# Patient Record
Sex: MALE | Race: WHITE | Employment: UNEMPLOYED | URBAN - METROPOLITAN AREA
[De-identification: names, ages, dates, MRNs, and addresses within clinical notes are randomized per-mention and may not be internally consistent; named-entity substitution may affect disease eponyms.]

---

## 2020-01-01 ENCOUNTER — APPOINTMENT (OUTPATIENT)
Dept: CT IMAGING | Age: 80
DRG: 871 | End: 2020-01-01
Payer: COMMERCIAL

## 2020-01-01 ENCOUNTER — APPOINTMENT (OUTPATIENT)
Dept: GENERAL RADIOLOGY | Age: 80
DRG: 871 | End: 2020-01-01
Payer: COMMERCIAL

## 2020-01-01 ENCOUNTER — HOSPITAL ENCOUNTER (INPATIENT)
Age: 80
LOS: 2 days | DRG: 871 | End: 2020-11-18
Attending: EMERGENCY MEDICINE | Admitting: INTERNAL MEDICINE
Payer: COMMERCIAL

## 2020-01-01 VITALS
HEIGHT: 74 IN | SYSTOLIC BLOOD PRESSURE: 60 MMHG | WEIGHT: 242.95 LBS | HEART RATE: 131 BPM | RESPIRATION RATE: 39 BRPM | BODY MASS INDEX: 31.18 KG/M2 | TEMPERATURE: 98.4 F | OXYGEN SATURATION: 74 % | DIASTOLIC BLOOD PRESSURE: 33 MMHG

## 2020-01-01 LAB
-: NORMAL
ABSOLUTE EOS #: 0 K/UL (ref 0–0.4)
ABSOLUTE EOS #: 0 K/UL (ref 0–0.4)
ABSOLUTE EOS #: 0 K/UL (ref 0–0.44)
ABSOLUTE EOS #: 0.24 K/UL (ref 0–0.44)
ABSOLUTE IMMATURE GRANULOCYTE: 0.08 K/UL (ref 0–0.3)
ABSOLUTE IMMATURE GRANULOCYTE: 0.37 K/UL (ref 0–0.3)
ABSOLUTE IMMATURE GRANULOCYTE: 0.78 K/UL (ref 0–0.3)
ABSOLUTE IMMATURE GRANULOCYTE: 0.88 K/UL (ref 0–0.3)
ABSOLUTE LYMPH #: 0.75 K/UL (ref 1.1–3.7)
ABSOLUTE LYMPH #: 1.16 K/UL (ref 1.1–3.7)
ABSOLUTE LYMPH #: 1.77 K/UL (ref 1–4.8)
ABSOLUTE LYMPH #: 2.24 K/UL (ref 1–4.8)
ABSOLUTE MONO #: 0.37 K/UL (ref 0.1–0.8)
ABSOLUTE MONO #: 0.78 K/UL (ref 0.1–1.2)
ABSOLUTE MONO #: 0.87 K/UL (ref 0.1–1.2)
ABSOLUTE MONO #: 2.21 K/UL (ref 0.1–0.8)
ACTION: NORMAL
ALLEN TEST: ABNORMAL
AMORPHOUS: NORMAL
ANION GAP SERPL CALCULATED.3IONS-SCNC: 11 MMOL/L (ref 9–17)
ANION GAP SERPL CALCULATED.3IONS-SCNC: 11 MMOL/L (ref 9–17)
ANION GAP SERPL CALCULATED.3IONS-SCNC: 13 MMOL/L (ref 9–17)
ANION GAP SERPL CALCULATED.3IONS-SCNC: 14 MMOL/L (ref 9–17)
ANION GAP SERPL CALCULATED.3IONS-SCNC: 15 MMOL/L (ref 9–17)
ANION GAP SERPL CALCULATED.3IONS-SCNC: 9 MMOL/L (ref 9–17)
ANION GAP: 16 MMOL/L (ref 7–16)
ANION GAP: 17 MMOL/L (ref 7–16)
ANION GAP: NORMAL MMOL/L (ref 7–16)
BACTERIA: NORMAL
BASOPHILS # BLD: 0 % (ref 0–2)
BASOPHILS ABSOLUTE: 0 K/UL (ref 0–0.2)
BASOPHILS ABSOLUTE: 0.07 K/UL (ref 0–0.2)
BETA-HYDROXYBUTYRATE: 0.11 MMOL/L (ref 0.02–0.27)
BILIRUBIN URINE: NEGATIVE
BNP INTERPRETATION: ABNORMAL
BNP INTERPRETATION: NORMAL
BUN BLDV-MCNC: 22 MG/DL (ref 8–23)
BUN BLDV-MCNC: 33 MG/DL (ref 8–23)
BUN BLDV-MCNC: 33 MG/DL (ref 8–23)
BUN BLDV-MCNC: 34 MG/DL (ref 8–23)
BUN BLDV-MCNC: 34 MG/DL (ref 8–23)
BUN BLDV-MCNC: 35 MG/DL (ref 8–23)
BUN BLDV-MCNC: 36 MG/DL (ref 8–23)
BUN BLDV-MCNC: 39 MG/DL (ref 8–23)
BUN/CREAT BLD: ABNORMAL (ref 9–20)
CALCIUM IONIZED: 1.03 MMOL/L (ref 1.13–1.33)
CALCIUM SERPL-MCNC: 7 MG/DL (ref 8.6–10.4)
CALCIUM SERPL-MCNC: 7.2 MG/DL (ref 8.6–10.4)
CALCIUM SERPL-MCNC: 7.2 MG/DL (ref 8.6–10.4)
CALCIUM SERPL-MCNC: 7.3 MG/DL (ref 8.6–10.4)
CALCIUM SERPL-MCNC: 7.4 MG/DL (ref 8.6–10.4)
CALCIUM SERPL-MCNC: 7.6 MG/DL (ref 8.6–10.4)
CALCIUM SERPL-MCNC: 7.7 MG/DL (ref 8.6–10.4)
CALCIUM SERPL-MCNC: 9.1 MG/DL (ref 8.6–10.4)
CASTS UA: NORMAL /LPF (ref 0–8)
CHLORIDE BLD-SCNC: 100 MMOL/L (ref 98–107)
CHLORIDE BLD-SCNC: 100 MMOL/L (ref 98–107)
CHLORIDE BLD-SCNC: 101 MMOL/L (ref 98–107)
CHLORIDE BLD-SCNC: 101 MMOL/L (ref 98–107)
CHLORIDE BLD-SCNC: 102 MMOL/L (ref 98–107)
CHLORIDE BLD-SCNC: 103 MMOL/L (ref 98–107)
CHLORIDE BLD-SCNC: 103 MMOL/L (ref 98–107)
CHLORIDE BLD-SCNC: 105 MMOL/L (ref 98–107)
CHOLESTEROL/HDL RATIO: 2.1
CHOLESTEROL: 64 MG/DL
CO2: 15 MMOL/L (ref 20–31)
CO2: 15 MMOL/L (ref 20–31)
CO2: 17 MMOL/L (ref 20–31)
CO2: 17 MMOL/L (ref 20–31)
CO2: 18 MMOL/L (ref 20–31)
CO2: 18 MMOL/L (ref 20–31)
CO2: 19 MMOL/L (ref 20–31)
CO2: 20 MMOL/L (ref 20–31)
COLOR: YELLOW
COMMENT UA: ABNORMAL
CREAT SERPL-MCNC: 1.08 MG/DL (ref 0.7–1.2)
CREAT SERPL-MCNC: 1.97 MG/DL (ref 0.7–1.2)
CREAT SERPL-MCNC: 2.29 MG/DL (ref 0.7–1.2)
CREAT SERPL-MCNC: 2.35 MG/DL (ref 0.7–1.2)
CREAT SERPL-MCNC: 2.64 MG/DL (ref 0.7–1.2)
CREAT SERPL-MCNC: 2.68 MG/DL (ref 0.7–1.2)
CREAT SERPL-MCNC: 3.1 MG/DL (ref 0.7–1.2)
CREAT SERPL-MCNC: 3.99 MG/DL (ref 0.7–1.2)
CRYSTALS, UA: NORMAL /HPF
CULTURE: NORMAL
CULTURE: NORMAL
D-DIMER QUANTITATIVE: 1.03 MG/L FEU
DATE AND TIME: NORMAL
DIFFERENTIAL TYPE: ABNORMAL
DIRECT EXAM: NORMAL
DIRECT EXAM: NORMAL
EKG ATRIAL RATE: 106 BPM
EKG ATRIAL RATE: 110 BPM
EKG ATRIAL RATE: 117 BPM
EKG ATRIAL RATE: 121 BPM
EKG ATRIAL RATE: 187 BPM
EKG ATRIAL RATE: 241 BPM
EKG ATRIAL RATE: 267 BPM
EKG ATRIAL RATE: 86 BPM
EKG ATRIAL RATE: 97 BPM
EKG P AXIS: 106 DEGREES
EKG P AXIS: 62 DEGREES
EKG P AXIS: 96 DEGREES
EKG P-R INTERVAL: 144 MS
EKG P-R INTERVAL: 188 MS
EKG P-R INTERVAL: 194 MS
EKG Q-T INTERVAL: 298 MS
EKG Q-T INTERVAL: 306 MS
EKG Q-T INTERVAL: 324 MS
EKG Q-T INTERVAL: 328 MS
EKG Q-T INTERVAL: 336 MS
EKG Q-T INTERVAL: 338 MS
EKG Q-T INTERVAL: 342 MS
EKG Q-T INTERVAL: 376 MS
EKG Q-T INTERVAL: 386 MS
EKG QRS DURATION: 100 MS
EKG QRS DURATION: 102 MS
EKG QRS DURATION: 104 MS
EKG QRS DURATION: 132 MS
EKG QRS DURATION: 138 MS
EKG QRS DURATION: 142 MS
EKG QRS DURATION: 94 MS
EKG QRS DURATION: 94 MS
EKG QRS DURATION: 96 MS
EKG QTC CALCULATION (BAZETT): 422 MS
EKG QTC CALCULATION (BAZETT): 423 MS
EKG QTC CALCULATION (BAZETT): 426 MS
EKG QTC CALCULATION (BAZETT): 432 MS
EKG QTC CALCULATION (BAZETT): 435 MS
EKG QTC CALCULATION (BAZETT): 436 MS
EKG QTC CALCULATION (BAZETT): 450 MS
EKG QTC CALCULATION (BAZETT): 492 MS
EKG QTC CALCULATION (BAZETT): 492 MS
EKG R AXIS: -39 DEGREES
EKG R AXIS: -60 DEGREES
EKG R AXIS: -64 DEGREES
EKG R AXIS: -65 DEGREES
EKG R AXIS: -71 DEGREES
EKG R AXIS: -73 DEGREES
EKG R AXIS: -78 DEGREES
EKG R AXIS: -78 DEGREES
EKG R AXIS: -90 DEGREES
EKG T AXIS: 57 DEGREES
EKG T AXIS: 63 DEGREES
EKG T AXIS: 69 DEGREES
EKG T AXIS: 70 DEGREES
EKG T AXIS: 70 DEGREES
EKG T AXIS: 77 DEGREES
EKG T AXIS: 79 DEGREES
EKG T AXIS: 80 DEGREES
EKG T AXIS: 90 DEGREES
EKG VENTRICULAR RATE: 103 BPM
EKG VENTRICULAR RATE: 106 BPM
EKG VENTRICULAR RATE: 107 BPM
EKG VENTRICULAR RATE: 108 BPM
EKG VENTRICULAR RATE: 117 BPM
EKG VENTRICULAR RATE: 121 BPM
EKG VENTRICULAR RATE: 94 BPM
EKG VENTRICULAR RATE: 98 BPM
EKG VENTRICULAR RATE: 98 BPM
EOSINOPHILS RELATIVE PERCENT: 0 % (ref 1–4)
EOSINOPHILS RELATIVE PERCENT: 1 % (ref 1–4)
EPITHELIAL CELLS UA: NORMAL /HPF (ref 0–5)
ESTIMATED AVERAGE GLUCOSE: 174 MG/DL
FIO2: 100
FIO2: ABNORMAL
GFR AFRICAN AMERICAN: 18 ML/MIN
GFR AFRICAN AMERICAN: 24 ML/MIN
GFR AFRICAN AMERICAN: 28 ML/MIN
GFR AFRICAN AMERICAN: 29 ML/MIN
GFR AFRICAN AMERICAN: 33 ML/MIN
GFR AFRICAN AMERICAN: 34 ML/MIN
GFR AFRICAN AMERICAN: 40 ML/MIN
GFR AFRICAN AMERICAN: >60 ML/MIN
GFR NON-AFRICAN AMERICAN: 15 ML/MIN
GFR NON-AFRICAN AMERICAN: 18 ML/MIN
GFR NON-AFRICAN AMERICAN: 20 ML/MIN
GFR NON-AFRICAN AMERICAN: 20 ML/MIN
GFR NON-AFRICAN AMERICAN: 23 ML/MIN
GFR NON-AFRICAN AMERICAN: 24 ML/MIN
GFR NON-AFRICAN AMERICAN: 27 ML/MIN
GFR NON-AFRICAN AMERICAN: 28 ML/MIN
GFR NON-AFRICAN AMERICAN: 33 ML/MIN
GFR NON-AFRICAN AMERICAN: >60 ML/MIN
GFR NON-AFRICAN AMERICAN: NORMAL ML/MIN
GFR SERPL CREATININE-BSD FRML MDRD: 21 ML/MIN
GFR SERPL CREATININE-BSD FRML MDRD: 24 ML/MIN
GFR SERPL CREATININE-BSD FRML MDRD: ABNORMAL ML/MIN/{1.73_M2}
GFR SERPL CREATININE-BSD FRML MDRD: NORMAL ML/MIN
GFR SERPL CREATININE-BSD FRML MDRD: NORMAL ML/MIN/{1.73_M2}
GLUCOSE BLD-MCNC: 127 MG/DL (ref 75–110)
GLUCOSE BLD-MCNC: 137 MG/DL (ref 74–100)
GLUCOSE BLD-MCNC: 138 MG/DL (ref 75–110)
GLUCOSE BLD-MCNC: 160 MG/DL (ref 74–100)
GLUCOSE BLD-MCNC: 179 MG/DL (ref 75–110)
GLUCOSE BLD-MCNC: 180 MG/DL (ref 70–99)
GLUCOSE BLD-MCNC: 194 MG/DL (ref 75–110)
GLUCOSE BLD-MCNC: 198 MG/DL (ref 74–100)
GLUCOSE BLD-MCNC: 205 MG/DL (ref 75–110)
GLUCOSE BLD-MCNC: 216 MG/DL (ref 75–110)
GLUCOSE BLD-MCNC: 219 MG/DL (ref 75–110)
GLUCOSE BLD-MCNC: 220 MG/DL (ref 70–99)
GLUCOSE BLD-MCNC: 230 MG/DL (ref 75–110)
GLUCOSE BLD-MCNC: 237 MG/DL (ref 70–99)
GLUCOSE BLD-MCNC: 240 MG/DL (ref 74–100)
GLUCOSE BLD-MCNC: 262 MG/DL (ref 75–110)
GLUCOSE BLD-MCNC: 320 MG/DL (ref 75–110)
GLUCOSE BLD-MCNC: 329 MG/DL (ref 75–110)
GLUCOSE BLD-MCNC: 336 MG/DL (ref 74–100)
GLUCOSE BLD-MCNC: 339 MG/DL (ref 75–110)
GLUCOSE BLD-MCNC: 343 MG/DL (ref 75–110)
GLUCOSE BLD-MCNC: 345 MG/DL (ref 75–110)
GLUCOSE BLD-MCNC: 367 MG/DL (ref 75–110)
GLUCOSE BLD-MCNC: 372 MG/DL (ref 75–110)
GLUCOSE BLD-MCNC: 403 MG/DL (ref 75–110)
GLUCOSE BLD-MCNC: 415 MG/DL (ref 75–110)
GLUCOSE BLD-MCNC: 421 MG/DL (ref 75–110)
GLUCOSE BLD-MCNC: 427 MG/DL (ref 70–99)
GLUCOSE BLD-MCNC: 442 MG/DL (ref 75–110)
GLUCOSE BLD-MCNC: 447 MG/DL (ref 70–99)
GLUCOSE BLD-MCNC: 451 MG/DL (ref 70–99)
GLUCOSE BLD-MCNC: 465 MG/DL (ref 75–110)
GLUCOSE BLD-MCNC: 470 MG/DL (ref 74–100)
GLUCOSE BLD-MCNC: 479 MG/DL (ref 70–99)
GLUCOSE BLD-MCNC: 480 MG/DL (ref 70–99)
GLUCOSE BLD-MCNC: 495 MG/DL (ref 74–100)
GLUCOSE BLD-MCNC: 515 MG/DL (ref 74–100)
GLUCOSE BLD-MCNC: 527 MG/DL (ref 74–100)
GLUCOSE URINE: NEGATIVE
HBA1C MFR BLD: 7.7 % (ref 4–6)
HCO3 VENOUS: 26.2 MMOL/L (ref 22–29)
HCT VFR BLD CALC: 41.3 % (ref 40.7–50.3)
HCT VFR BLD CALC: 43.6 % (ref 40.7–50.3)
HCT VFR BLD CALC: 44.8 % (ref 40.7–50.3)
HCT VFR BLD CALC: 46.7 % (ref 40.7–50.3)
HCT VFR BLD CALC: 48 % (ref 40.7–50.3)
HDLC SERPL-MCNC: 30 MG/DL
HEMOGLOBIN: 12.4 G/DL (ref 13–17)
HEMOGLOBIN: 13.3 G/DL (ref 13–17)
HEMOGLOBIN: 13.7 G/DL (ref 13–17)
HEMOGLOBIN: 14 G/DL (ref 13–17)
HEMOGLOBIN: 14.2 G/DL (ref 13–17)
IMMATURE GRANULOCYTES: 0 %
IMMATURE GRANULOCYTES: 1 %
IMMATURE GRANULOCYTES: 2 %
IMMATURE GRANULOCYTES: 2 %
INR BLD: 1
KETONES, URINE: NEGATIVE
LACTIC ACID, SEPSIS WHOLE BLOOD: 1.9 MMOL/L (ref 0.5–1.9)
LACTIC ACID, SEPSIS WHOLE BLOOD: 2 MMOL/L (ref 0.5–1.9)
LACTIC ACID, SEPSIS WHOLE BLOOD: 3.1 MMOL/L (ref 0.5–1.9)
LACTIC ACID, SEPSIS: ABNORMAL MMOL/L (ref 0.5–1.9)
LACTIC ACID, SEPSIS: ABNORMAL MMOL/L (ref 0.5–1.9)
LACTIC ACID, SEPSIS: NORMAL MMOL/L (ref 0.5–1.9)
LACTIC ACID, WHOLE BLOOD: 2.9 MMOL/L (ref 0.7–2.1)
LDL CHOLESTEROL: 0 MG/DL (ref 0–130)
LEUKOCYTE ESTERASE, URINE: NEGATIVE
LYMPHOCYTES # BLD: 3 % (ref 24–43)
LYMPHOCYTES # BLD: 4 % (ref 24–43)
LYMPHOCYTES # BLD: 4 % (ref 24–44)
LYMPHOCYTES # BLD: 6 % (ref 24–44)
Lab: NORMAL
MAGNESIUM: 1 MG/DL (ref 1.6–2.6)
MAGNESIUM: 1.1 MG/DL (ref 1.6–2.6)
MAGNESIUM: 1.3 MG/DL (ref 1.6–2.6)
MAGNESIUM: 1.4 MG/DL (ref 1.6–2.6)
MAGNESIUM: 1.4 MG/DL (ref 1.6–2.6)
MAGNESIUM: 1.5 MG/DL (ref 1.6–2.6)
MAGNESIUM: 1.6 MG/DL (ref 1.6–2.6)
MAGNESIUM: 1.8 MG/DL (ref 1.6–2.6)
MAGNESIUM: 1.8 MG/DL (ref 1.6–2.6)
MAGNESIUM: 1.9 MG/DL (ref 1.6–2.6)
MAGNESIUM: 2 MG/DL (ref 1.6–2.6)
MAGNESIUM: 2.2 MG/DL (ref 1.6–2.6)
MCH RBC QN AUTO: 29 PG (ref 25.2–33.5)
MCH RBC QN AUTO: 29.3 PG (ref 25.2–33.5)
MCH RBC QN AUTO: 29.3 PG (ref 25.2–33.5)
MCH RBC QN AUTO: 29.4 PG (ref 25.2–33.5)
MCHC RBC AUTO-ENTMCNC: 29.3 G/DL (ref 28.4–34.8)
MCHC RBC AUTO-ENTMCNC: 29.6 G/DL (ref 28.4–34.8)
MCHC RBC AUTO-ENTMCNC: 30 G/DL (ref 28.4–34.8)
MCHC RBC AUTO-ENTMCNC: 31.3 G/DL (ref 28.4–34.8)
MCV RBC AUTO: 94.1 FL (ref 82.6–102.9)
MCV RBC AUTO: 97.6 FL (ref 82.6–102.9)
MCV RBC AUTO: 98.7 FL (ref 82.6–102.9)
MCV RBC AUTO: 99.2 FL (ref 82.6–102.9)
MODE: ABNORMAL
MONOCYTES # BLD: 1 % (ref 1–7)
MONOCYTES # BLD: 2 % (ref 3–12)
MONOCYTES # BLD: 4 % (ref 3–12)
MONOCYTES # BLD: 5 % (ref 1–7)
MORPHOLOGY: ABNORMAL
MRSA, DNA, NASAL: NORMAL
MUCUS: NORMAL
NEGATIVE BASE EXCESS, ART: 12 (ref 0–2)
NEGATIVE BASE EXCESS, ART: 13 (ref 0–2)
NEGATIVE BASE EXCESS, ART: 13 (ref 0–2)
NEGATIVE BASE EXCESS, ART: 14 (ref 0–2)
NEGATIVE BASE EXCESS, ART: 15 (ref 0–2)
NEGATIVE BASE EXCESS, ART: 15 (ref 0–2)
NEGATIVE BASE EXCESS, ART: 16 (ref 0–2)
NEGATIVE BASE EXCESS, ART: 18 (ref 0–2)
NEGATIVE BASE EXCESS, ART: 6 (ref 0–2)
NEGATIVE BASE EXCESS, ART: 8 (ref 0–2)
NEGATIVE BASE EXCESS, VEN: 3 (ref 0–2)
NITRITE, URINE: NEGATIVE
NOTIFY: NORMAL
NRBC AUTOMATED: 0 PER 100 WBC
NRBC AUTOMATED: 0.1 PER 100 WBC
O2 DEVICE/FLOW/%: ABNORMAL
O2 SAT, VEN: 23 % (ref 60–85)
OTHER OBSERVATIONS UA: NORMAL
PARTIAL THROMBOPLASTIN TIME: 19.8 SEC (ref 20.5–30.5)
PATIENT TEMP: ABNORMAL
PCO2, VEN: 60.6 MM HG (ref 41–51)
PDW BLD-RTO: 12.3 % (ref 11.8–14.4)
PDW BLD-RTO: 12.4 % (ref 11.8–14.4)
PDW BLD-RTO: 12.6 % (ref 11.8–14.4)
PDW BLD-RTO: 12.8 % (ref 11.8–14.4)
PH UA: 5 (ref 5–8)
PH VENOUS: 7.24 (ref 7.32–7.43)
PHOSPHORUS: 3.9 MG/DL (ref 2.5–4.5)
PHOSPHORUS: 4.4 MG/DL (ref 2.5–4.5)
PHOSPHORUS: 4.7 MG/DL (ref 2.5–4.5)
PHOSPHORUS: 4.9 MG/DL (ref 2.5–4.5)
PHOSPHORUS: 5.1 MG/DL (ref 2.5–4.5)
PHOSPHORUS: 5.1 MG/DL (ref 2.5–4.5)
PHOSPHORUS: 5.2 MG/DL (ref 2.5–4.5)
PHOSPHORUS: 5.3 MG/DL (ref 2.5–4.5)
PHOSPHORUS: 5.3 MG/DL (ref 2.5–4.5)
PHOSPHORUS: 5.6 MG/DL (ref 2.5–4.5)
PHOSPHORUS: 6 MG/DL (ref 2.5–4.5)
PHOSPHORUS: 6.3 MG/DL (ref 2.5–4.5)
PLATELET # BLD: 224 K/UL (ref 138–453)
PLATELET # BLD: 231 K/UL (ref 138–453)
PLATELET # BLD: 305 K/UL (ref 138–453)
PLATELET # BLD: 313 K/UL (ref 138–453)
PLATELET ESTIMATE: ABNORMAL
PMV BLD AUTO: 10.6 FL (ref 8.1–13.5)
PMV BLD AUTO: 10.9 FL (ref 8.1–13.5)
PMV BLD AUTO: 11.4 FL (ref 8.1–13.5)
PMV BLD AUTO: 11.5 FL (ref 8.1–13.5)
PO2, VEN: 19.9 MM HG (ref 30–50)
POC CHLORIDE: 103 MMOL/L (ref 98–107)
POC CHLORIDE: 103 MMOL/L (ref 98–107)
POC CHLORIDE: 98 MMOL/L (ref 98–107)
POC CREATININE: 3.04 MG/DL (ref 0.51–1.19)
POC CREATININE: 3.41 MG/DL (ref 0.51–1.19)
POC CREATININE: NORMAL MG/DL (ref 0.51–1.19)
POC HCO3: 18.4 MMOL/L (ref 21–28)
POC HCO3: 18.5 MMOL/L (ref 21–28)
POC HCO3: 18.5 MMOL/L (ref 21–28)
POC HCO3: 19 MMOL/L (ref 21–28)
POC HCO3: 19.4 MMOL/L (ref 21–28)
POC HCO3: 19.9 MMOL/L (ref 21–28)
POC HCO3: 20 MMOL/L (ref 21–28)
POC HCO3: 20.4 MMOL/L (ref 21–28)
POC HCO3: 20.4 MMOL/L (ref 21–28)
POC HCO3: 20.7 MMOL/L (ref 21–28)
POC HCO3: 20.8 MMOL/L (ref 21–28)
POC HCO3: 22 MMOL/L (ref 21–28)
POC HCO3: 22.2 MMOL/L (ref 21–28)
POC HEMATOCRIT: 34 % (ref 41–53)
POC HEMATOCRIT: 34 % (ref 41–53)
POC HEMATOCRIT: NORMAL % (ref 41–53)
POC HEMOGLOBIN: 11.4 G/DL (ref 13.5–17.5)
POC HEMOGLOBIN: 11.7 G/DL (ref 13.5–17.5)
POC HEMOGLOBIN: NORMAL G/DL (ref 13.5–17.5)
POC IONIZED CALCIUM: 1.1 MMOL/L (ref 1.15–1.33)
POC IONIZED CALCIUM: 1.28 MMOL/L (ref 1.15–1.33)
POC IONIZED CALCIUM: 1.29 MMOL/L (ref 1.15–1.33)
POC LACTIC ACID: 1.84 MMOL/L (ref 0.56–1.39)
POC LACTIC ACID: 12.06 MMOL/L (ref 0.56–1.39)
POC LACTIC ACID: 3.21 MMOL/L (ref 0.56–1.39)
POC LACTIC ACID: 4.01 MMOL/L (ref 0.56–1.39)
POC LACTIC ACID: 4.82 MMOL/L (ref 0.56–1.39)
POC LACTIC ACID: 5 MMOL/L (ref 0.56–1.39)
POC LACTIC ACID: 5.25 MMOL/L (ref 0.56–1.39)
POC LACTIC ACID: 8.05 MMOL/L (ref 0.56–1.39)
POC LACTIC ACID: 9.19 MMOL/L (ref 0.56–1.39)
POC O2 SATURATION: 44 % (ref 94–98)
POC O2 SATURATION: 49 % (ref 94–98)
POC O2 SATURATION: 49 % (ref 94–98)
POC O2 SATURATION: 54 % (ref 94–98)
POC O2 SATURATION: 64 % (ref 94–98)
POC O2 SATURATION: 64 % (ref 94–98)
POC O2 SATURATION: 65 % (ref 94–98)
POC O2 SATURATION: 66 % (ref 94–98)
POC O2 SATURATION: 71 % (ref 94–98)
POC O2 SATURATION: 73 % (ref 94–98)
POC O2 SATURATION: 81 % (ref 94–98)
POC O2 SATURATION: 95 % (ref 94–98)
POC O2 SATURATION: 98 % (ref 94–98)
POC PCO2 TEMP: ABNORMAL MM HG
POC PCO2: 100.8 MM HG (ref 35–48)
POC PCO2: 101.2 MM HG (ref 35–48)
POC PCO2: 102.8 MM HG (ref 35–48)
POC PCO2: 51.5 MM HG (ref 35–48)
POC PCO2: 55.1 MM HG (ref 35–48)
POC PCO2: 83.4 MM HG (ref 35–48)
POC PCO2: 84.7 MM HG (ref 35–48)
POC PCO2: 85.5 MM HG (ref 35–48)
POC PCO2: 85.5 MM HG (ref 35–48)
POC PCO2: 86.3 MM HG (ref 35–48)
POC PCO2: 87.1 MM HG (ref 35–48)
POC PCO2: 90.4 MM HG (ref 35–48)
POC PCO2: 92.9 MM HG (ref 35–48)
POC PH TEMP: ABNORMAL
POC PH: 6.86 (ref 7.35–7.45)
POC PH: 6.91 (ref 7.35–7.45)
POC PH: 6.92 (ref 7.35–7.45)
POC PH: 6.94 (ref 7.35–7.45)
POC PH: 6.94 (ref 7.35–7.45)
POC PH: 6.95 (ref 7.35–7.45)
POC PH: 6.95 (ref 7.35–7.45)
POC PH: 6.96 (ref 7.35–7.45)
POC PH: 6.98 (ref 7.35–7.45)
POC PH: 6.98 (ref 7.35–7.45)
POC PH: 7 (ref 7.35–7.45)
POC PH: 7.21 (ref 7.35–7.45)
POC PH: 7.21 (ref 7.35–7.45)
POC PO2 TEMP: ABNORMAL MM HG
POC PO2: 123.2 MM HG (ref 83–108)
POC PO2: 41.2 MM HG (ref 83–108)
POC PO2: 42.8 MM HG (ref 83–108)
POC PO2: 43.9 MM HG (ref 83–108)
POC PO2: 46.5 MM HG (ref 83–108)
POC PO2: 52.3 MM HG (ref 83–108)
POC PO2: 52.4 MM HG (ref 83–108)
POC PO2: 53.1 MM HG (ref 83–108)
POC PO2: 53.6 MM HG (ref 83–108)
POC PO2: 59.1 MM HG (ref 83–108)
POC PO2: 69 MM HG (ref 83–108)
POC PO2: 74.2 MM HG (ref 83–108)
POC PO2: 90.5 MM HG (ref 83–108)
POC POTASSIUM: 4.7 MMOL/L (ref 3.5–4.5)
POC POTASSIUM: 4.7 MMOL/L (ref 3.5–4.5)
POC POTASSIUM: 5.2 MMOL/L (ref 3.5–4.5)
POC SODIUM: 135 MMOL/L (ref 138–146)
POC SODIUM: 137 MMOL/L (ref 138–146)
POC SODIUM: NORMAL MMOL/L (ref 138–146)
POSITIVE BASE EXCESS, ART: ABNORMAL (ref 0–3)
POSITIVE BASE EXCESS, VEN: ABNORMAL (ref 0–3)
POTASSIUM SERPL-SCNC: 4.6 MMOL/L (ref 3.7–5.3)
POTASSIUM SERPL-SCNC: 4.7 MMOL/L (ref 3.7–5.3)
POTASSIUM SERPL-SCNC: 4.9 MMOL/L (ref 3.7–5.3)
POTASSIUM SERPL-SCNC: 5.4 MMOL/L (ref 3.7–5.3)
POTASSIUM SERPL-SCNC: 5.5 MMOL/L (ref 3.7–5.3)
POTASSIUM SERPL-SCNC: 5.9 MMOL/L (ref 3.7–5.3)
POTASSIUM SERPL-SCNC: 6 MMOL/L (ref 3.7–5.3)
POTASSIUM SERPL-SCNC: 6.2 MMOL/L (ref 3.7–5.3)
POTASSIUM SERPL-SCNC: 6.7 MMOL/L (ref 3.7–5.3)
PRO-BNP: 139 PG/ML
PRO-BNP: 7908 PG/ML
PROTEIN UA: ABNORMAL
PROTHROMBIN TIME: 10.1 SEC (ref 9–12)
RBC # BLD: 4.23 M/UL (ref 4.21–5.77)
RBC # BLD: 4.73 M/UL (ref 4.21–5.77)
RBC # BLD: 4.76 M/UL (ref 4.21–5.77)
RBC # BLD: 4.84 M/UL (ref 4.21–5.77)
RBC # BLD: ABNORMAL 10*6/UL
RBC UA: NORMAL /HPF (ref 0–4)
READ BACK: YES
RENAL EPITHELIAL, UA: NORMAL /HPF
SAMPLE SITE: ABNORMAL
SARS-COV-2, RAPID: NOT DETECTED
SARS-COV-2: NORMAL
SARS-COV-2: NORMAL
SEG NEUTROPHILS: 89 % (ref 36–66)
SEG NEUTROPHILS: 91 % (ref 36–65)
SEG NEUTROPHILS: 92 % (ref 36–66)
SEG NEUTROPHILS: 93 % (ref 36–65)
SEGMENTED NEUTROPHILS ABSOLUTE COUNT: 19.44 K/UL (ref 1.5–8.1)
SEGMENTED NEUTROPHILS ABSOLUTE COUNT: 34.42 K/UL (ref 1.8–7.7)
SEGMENTED NEUTROPHILS ABSOLUTE COUNT: 36.08 K/UL (ref 1.5–8.1)
SEGMENTED NEUTROPHILS ABSOLUTE COUNT: 39.34 K/UL (ref 1.8–7.7)
SODIUM BLD-SCNC: 126 MMOL/L (ref 135–144)
SODIUM BLD-SCNC: 128 MMOL/L (ref 135–144)
SODIUM BLD-SCNC: 133 MMOL/L (ref 135–144)
SODIUM BLD-SCNC: 134 MMOL/L (ref 135–144)
SODIUM BLD-SCNC: 134 MMOL/L (ref 135–144)
SODIUM BLD-SCNC: 136 MMOL/L (ref 135–144)
SOURCE: NORMAL
SPECIFIC GRAVITY UA: 1.03 (ref 1–1.03)
SPECIMEN DESCRIPTION: NORMAL
TCO2 (CALC), ART: 21 MMOL/L (ref 22–29)
TCO2 (CALC), ART: 21 MMOL/L (ref 22–29)
TCO2 (CALC), ART: 22 MMOL/L (ref 22–29)
TCO2 (CALC), ART: 23 MMOL/L (ref 22–29)
TCO2 (CALC), ART: 24 MMOL/L (ref 22–29)
TCO2 (CALC), ART: 24 MMOL/L (ref 22–29)
TCO2 (CALC), ART: 25 MMOL/L (ref 22–29)
TOTAL CO2, VENOUS: 28 MMOL/L (ref 23–30)
TRICHOMONAS: NORMAL
TRIGL SERPL-MCNC: 169 MG/DL
TROPONIN INTERP: ABNORMAL
TROPONIN T: ABNORMAL NG/ML
TROPONIN, HIGH SENSITIVITY: 117 NG/L (ref 0–22)
TROPONIN, HIGH SENSITIVITY: 203 NG/L (ref 0–22)
TROPONIN, HIGH SENSITIVITY: 221 NG/L (ref 0–22)
TROPONIN, HIGH SENSITIVITY: 292 NG/L (ref 0–22)
TROPONIN, HIGH SENSITIVITY: 36 NG/L (ref 0–22)
TROPONIN, HIGH SENSITIVITY: 38 NG/L (ref 0–22)
TROPONIN, HIGH SENSITIVITY: 70 NG/L (ref 0–22)
TROPONIN, HIGH SENSITIVITY: 96 NG/L (ref 0–22)
TSH SERPL DL<=0.05 MIU/L-ACNC: 0.85 MIU/L (ref 0.3–5)
TURBIDITY: CLEAR
URINE HGB: NEGATIVE
UROBILINOGEN, URINE: NORMAL
VLDLC SERPL CALC-MCNC: ABNORMAL MG/DL (ref 1–30)
WBC # BLD: 21.5 K/UL (ref 3.5–11.3)
WBC # BLD: 37.4 K/UL (ref 3.5–11.3)
WBC # BLD: 38.8 K/UL (ref 3.5–11.3)
WBC # BLD: 44.2 K/UL (ref 3.5–11.3)
WBC # BLD: ABNORMAL 10*3/UL
WBC UA: NORMAL /HPF (ref 0–5)
YEAST: NORMAL

## 2020-01-01 PROCEDURE — 99222 1ST HOSP IP/OBS MODERATE 55: CPT | Performed by: FAMILY MEDICINE

## 2020-01-01 PROCEDURE — 6360000002 HC RX W HCPCS: Performed by: STUDENT IN AN ORGANIZED HEALTH CARE EDUCATION/TRAINING PROGRAM

## 2020-01-01 PROCEDURE — 2500000003 HC RX 250 WO HCPCS: Performed by: STUDENT IN AN ORGANIZED HEALTH CARE EDUCATION/TRAINING PROGRAM

## 2020-01-01 PROCEDURE — 83605 ASSAY OF LACTIC ACID: CPT

## 2020-01-01 PROCEDURE — 36556 INSERT NON-TUNNEL CV CATH: CPT | Performed by: INTERNAL MEDICINE

## 2020-01-01 PROCEDURE — 94660 CPAP INITIATION&MGMT: CPT

## 2020-01-01 PROCEDURE — U0002 COVID-19 LAB TEST NON-CDC: HCPCS

## 2020-01-01 PROCEDURE — 93970 EXTREMITY STUDY: CPT

## 2020-01-01 PROCEDURE — 93005 ELECTROCARDIOGRAM TRACING: CPT | Performed by: STUDENT IN AN ORGANIZED HEALTH CARE EDUCATION/TRAINING PROGRAM

## 2020-01-01 PROCEDURE — 71045 X-RAY EXAM CHEST 1 VIEW: CPT

## 2020-01-01 PROCEDURE — 2580000003 HC RX 258: Performed by: NURSE PRACTITIONER

## 2020-01-01 PROCEDURE — 6370000000 HC RX 637 (ALT 250 FOR IP): Performed by: STUDENT IN AN ORGANIZED HEALTH CARE EDUCATION/TRAINING PROGRAM

## 2020-01-01 PROCEDURE — 2580000003 HC RX 258: Performed by: STUDENT IN AN ORGANIZED HEALTH CARE EDUCATION/TRAINING PROGRAM

## 2020-01-01 PROCEDURE — 37799 UNLISTED PX VASCULAR SURGERY: CPT

## 2020-01-01 PROCEDURE — 83735 ASSAY OF MAGNESIUM: CPT

## 2020-01-01 PROCEDURE — 96366 THER/PROPH/DIAG IV INF ADDON: CPT

## 2020-01-01 PROCEDURE — 82435 ASSAY OF BLOOD CHLORIDE: CPT

## 2020-01-01 PROCEDURE — 80048 BASIC METABOLIC PNL TOTAL CA: CPT

## 2020-01-01 PROCEDURE — 6370000000 HC RX 637 (ALT 250 FOR IP): Performed by: INTERNAL MEDICINE

## 2020-01-01 PROCEDURE — 2700000000 HC OXYGEN THERAPY PER DAY

## 2020-01-01 PROCEDURE — 87086 URINE CULTURE/COLONY COUNT: CPT

## 2020-01-01 PROCEDURE — 99223 1ST HOSP IP/OBS HIGH 75: CPT | Performed by: INTERNAL MEDICINE

## 2020-01-01 PROCEDURE — 84132 ASSAY OF SERUM POTASSIUM: CPT

## 2020-01-01 PROCEDURE — 96365 THER/PROPH/DIAG IV INF INIT: CPT

## 2020-01-01 PROCEDURE — 02HV33Z INSERTION OF INFUSION DEVICE INTO SUPERIOR VENA CAVA, PERCUTANEOUS APPROACH: ICD-10-PCS | Performed by: INTERNAL MEDICINE

## 2020-01-01 PROCEDURE — 6360000002 HC RX W HCPCS: Performed by: INTERNAL MEDICINE

## 2020-01-01 PROCEDURE — 6360000002 HC RX W HCPCS

## 2020-01-01 PROCEDURE — 87040 BLOOD CULTURE FOR BACTERIA: CPT

## 2020-01-01 PROCEDURE — 6360000002 HC RX W HCPCS: Performed by: EMERGENCY MEDICINE

## 2020-01-01 PROCEDURE — 84443 ASSAY THYROID STIM HORMONE: CPT

## 2020-01-01 PROCEDURE — 2580000003 HC RX 258: Performed by: INTERNAL MEDICINE

## 2020-01-01 PROCEDURE — 2580000003 HC RX 258

## 2020-01-01 PROCEDURE — 94770 HC ETCO2 MONITOR DAILY: CPT

## 2020-01-01 PROCEDURE — 85025 COMPLETE CBC W/AUTO DIFF WBC: CPT

## 2020-01-01 PROCEDURE — 82947 ASSAY GLUCOSE BLOOD QUANT: CPT

## 2020-01-01 PROCEDURE — 82803 BLOOD GASES ANY COMBINATION: CPT

## 2020-01-01 PROCEDURE — 94640 AIRWAY INHALATION TREATMENT: CPT

## 2020-01-01 PROCEDURE — 36620 INSERTION CATHETER ARTERY: CPT

## 2020-01-01 PROCEDURE — 87641 MR-STAPH DNA AMP PROBE: CPT

## 2020-01-01 PROCEDURE — 94761 N-INVAS EAR/PLS OXIMETRY MLT: CPT

## 2020-01-01 PROCEDURE — 87186 SC STD MICRODIL/AGAR DIL: CPT

## 2020-01-01 PROCEDURE — 2000000000 HC ICU R&B

## 2020-01-01 PROCEDURE — 84484 ASSAY OF TROPONIN QUANT: CPT

## 2020-01-01 PROCEDURE — 99222 1ST HOSP IP/OBS MODERATE 55: CPT | Performed by: SURGERY

## 2020-01-01 PROCEDURE — 85014 HEMATOCRIT: CPT

## 2020-01-01 PROCEDURE — 93010 ELECTROCARDIOGRAM REPORT: CPT | Performed by: INTERNAL MEDICINE

## 2020-01-01 PROCEDURE — 6360000004 HC RX CONTRAST MEDICATION: Performed by: STUDENT IN AN ORGANIZED HEALTH CARE EDUCATION/TRAINING PROGRAM

## 2020-01-01 PROCEDURE — 99291 CRITICAL CARE FIRST HOUR: CPT | Performed by: INTERNAL MEDICINE

## 2020-01-01 PROCEDURE — 82010 KETONE BODYS QUAN: CPT

## 2020-01-01 PROCEDURE — 82330 ASSAY OF CALCIUM: CPT

## 2020-01-01 PROCEDURE — 87070 CULTURE OTHR SPECIMN AEROBIC: CPT

## 2020-01-01 PROCEDURE — 96372 THER/PROPH/DIAG INJ SC/IM: CPT

## 2020-01-01 PROCEDURE — 2500000003 HC RX 250 WO HCPCS

## 2020-01-01 PROCEDURE — 84295 ASSAY OF SERUM SODIUM: CPT

## 2020-01-01 PROCEDURE — 31500 INSERT EMERGENCY AIRWAY: CPT | Performed by: INTERNAL MEDICINE

## 2020-01-01 PROCEDURE — 82565 ASSAY OF CREATININE: CPT

## 2020-01-01 PROCEDURE — 71260 CT THORAX DX C+: CPT

## 2020-01-01 PROCEDURE — 5A1945Z RESPIRATORY VENTILATION, 24-96 CONSECUTIVE HOURS: ICD-10-PCS | Performed by: INTERNAL MEDICINE

## 2020-01-01 PROCEDURE — 94003 VENT MGMT INPAT SUBQ DAY: CPT

## 2020-01-01 PROCEDURE — 89220 SPUTUM SPECIMEN COLLECTION: CPT

## 2020-01-01 PROCEDURE — 85610 PROTHROMBIN TIME: CPT

## 2020-01-01 PROCEDURE — 0BH18EZ INSERTION OF ENDOTRACHEAL AIRWAY INTO TRACHEA, VIA NATURAL OR ARTIFICIAL OPENING ENDOSCOPIC: ICD-10-PCS | Performed by: INTERNAL MEDICINE

## 2020-01-01 PROCEDURE — 81001 URINALYSIS AUTO W/SCOPE: CPT

## 2020-01-01 PROCEDURE — 2500000003 HC RX 250 WO HCPCS: Performed by: INTERNAL MEDICINE

## 2020-01-01 PROCEDURE — 80061 LIPID PANEL: CPT

## 2020-01-01 PROCEDURE — 36556 INSERT NON-TUNNEL CV CATH: CPT

## 2020-01-01 PROCEDURE — 99285 EMERGENCY DEPT VISIT HI MDM: CPT

## 2020-01-01 PROCEDURE — 6370000000 HC RX 637 (ALT 250 FOR IP): Performed by: NURSE PRACTITIONER

## 2020-01-01 PROCEDURE — 83880 ASSAY OF NATRIURETIC PEPTIDE: CPT

## 2020-01-01 PROCEDURE — 87077 CULTURE AEROBIC IDENTIFY: CPT

## 2020-01-01 PROCEDURE — 93306 TTE W/DOPPLER COMPLETE: CPT

## 2020-01-01 PROCEDURE — 85379 FIBRIN DEGRADATION QUANT: CPT

## 2020-01-01 PROCEDURE — 36415 COLL VENOUS BLD VENIPUNCTURE: CPT

## 2020-01-01 PROCEDURE — 94002 VENT MGMT INPAT INIT DAY: CPT

## 2020-01-01 PROCEDURE — 93005 ELECTROCARDIOGRAM TRACING: CPT | Performed by: INTERNAL MEDICINE

## 2020-01-01 PROCEDURE — 85730 THROMBOPLASTIN TIME PARTIAL: CPT

## 2020-01-01 PROCEDURE — 84100 ASSAY OF PHOSPHORUS: CPT

## 2020-01-01 PROCEDURE — 31500 INSERT EMERGENCY AIRWAY: CPT

## 2020-01-01 PROCEDURE — 87205 SMEAR GRAM STAIN: CPT

## 2020-01-01 PROCEDURE — 92950 HEART/LUNG RESUSCITATION CPR: CPT

## 2020-01-01 PROCEDURE — 85018 HEMOGLOBIN: CPT

## 2020-01-01 PROCEDURE — 6360000002 HC RX W HCPCS: Performed by: NURSE PRACTITIONER

## 2020-01-01 PROCEDURE — 87804 INFLUENZA ASSAY W/OPTIC: CPT

## 2020-01-01 PROCEDURE — P9041 ALBUMIN (HUMAN),5%, 50ML: HCPCS | Performed by: STUDENT IN AN ORGANIZED HEALTH CARE EDUCATION/TRAINING PROGRAM

## 2020-01-01 PROCEDURE — 83036 HEMOGLOBIN GLYCOSYLATED A1C: CPT

## 2020-01-01 RX ORDER — ALBUTEROL SULFATE 2.5 MG/3ML
2.5 SOLUTION RESPIRATORY (INHALATION) 2 TIMES DAILY
Status: DISCONTINUED | OUTPATIENT
Start: 2020-01-01 | End: 2020-01-01

## 2020-01-01 RX ORDER — NOREPINEPHRINE BIT/0.9 % NACL 16MG/250ML
INFUSION BOTTLE (ML) INTRAVENOUS
Status: COMPLETED
Start: 2020-01-01 | End: 2020-01-01

## 2020-01-01 RX ORDER — DEXTROSE MONOHYDRATE 25 G/50ML
12.5 INJECTION, SOLUTION INTRAVENOUS PRN
Status: DISCONTINUED | OUTPATIENT
Start: 2020-01-01 | End: 2020-01-01 | Stop reason: SDUPTHER

## 2020-01-01 RX ORDER — ALBUMIN, HUMAN INJ 5% 5 %
25 SOLUTION INTRAVENOUS ONCE
Status: COMPLETED | OUTPATIENT
Start: 2020-01-01 | End: 2020-01-01

## 2020-01-01 RX ORDER — 0.9 % SODIUM CHLORIDE 0.9 %
500 INTRAVENOUS SOLUTION INTRAVENOUS ONCE
Status: COMPLETED | OUTPATIENT
Start: 2020-01-01 | End: 2020-01-01

## 2020-01-01 RX ORDER — CHLORHEXIDINE GLUCONATE 0.12 MG/ML
15 RINSE ORAL 2 TIMES DAILY
Status: DISCONTINUED | OUTPATIENT
Start: 2020-01-01 | End: 2020-01-01

## 2020-01-01 RX ORDER — EZETIMIBE 10 MG/1
10 TABLET ORAL DAILY
Status: DISCONTINUED | OUTPATIENT
Start: 2020-01-01 | End: 2020-01-01 | Stop reason: HOSPADM

## 2020-01-01 RX ORDER — HYDROCHLOROTHIAZIDE 25 MG/1
25 TABLET ORAL DAILY
Status: DISCONTINUED | OUTPATIENT
Start: 2020-01-01 | End: 2020-01-01

## 2020-01-01 RX ORDER — ALBUTEROL SULFATE 2.5 MG/3ML
2.5 SOLUTION RESPIRATORY (INHALATION) 2 TIMES DAILY
Status: DISCONTINUED | OUTPATIENT
Start: 2020-01-01 | End: 2020-01-01 | Stop reason: HOSPADM

## 2020-01-01 RX ORDER — ACETAMINOPHEN 325 MG/1
650 TABLET ORAL EVERY 6 HOURS PRN
Status: DISCONTINUED | OUTPATIENT
Start: 2020-01-01 | End: 2020-01-01 | Stop reason: HOSPADM

## 2020-01-01 RX ORDER — NICOTINE 21 MG/24HR
1 PATCH, TRANSDERMAL 24 HOURS TRANSDERMAL DAILY PRN
Status: DISCONTINUED | OUTPATIENT
Start: 2020-01-01 | End: 2020-01-01 | Stop reason: HOSPADM

## 2020-01-01 RX ORDER — LISINOPRIL 20 MG/1
40 TABLET ORAL DAILY
Status: DISCONTINUED | OUTPATIENT
Start: 2020-01-01 | End: 2020-01-01

## 2020-01-01 RX ORDER — NOREPINEPHRINE BIT/0.9 % NACL 16MG/250ML
2 INFUSION BOTTLE (ML) INTRAVENOUS CONTINUOUS
Status: DISCONTINUED | OUTPATIENT
Start: 2020-01-01 | End: 2020-01-01 | Stop reason: HOSPADM

## 2020-01-01 RX ORDER — SODIUM CHLORIDE 9 MG/ML
INJECTION, SOLUTION INTRAVENOUS CONTINUOUS
Status: DISCONTINUED | OUTPATIENT
Start: 2020-01-01 | End: 2020-01-01

## 2020-01-01 RX ORDER — PIOGLITAZONEHYDROCHLORIDE 15 MG/1
15 TABLET ORAL DAILY
Status: DISCONTINUED | OUTPATIENT
Start: 2020-01-01 | End: 2020-01-01

## 2020-01-01 RX ORDER — CALCIUM GLUCONATE 94 MG/ML
2 INJECTION, SOLUTION INTRAVENOUS ONCE
Status: DISCONTINUED | OUTPATIENT
Start: 2020-01-01 | End: 2020-01-01

## 2020-01-01 RX ORDER — LISINOPRIL 5 MG/1
5 TABLET ORAL DAILY
Status: DISCONTINUED | OUTPATIENT
Start: 2020-01-01 | End: 2020-01-01

## 2020-01-01 RX ORDER — PROPOFOL 10 MG/ML
INJECTION, EMULSION INTRAVENOUS
Status: DISCONTINUED
Start: 2020-01-01 | End: 2020-01-01

## 2020-01-01 RX ORDER — HYDROXYZINE HYDROCHLORIDE 50 MG/ML
50 INJECTION, SOLUTION INTRAMUSCULAR ONCE
Status: COMPLETED | OUTPATIENT
Start: 2020-01-01 | End: 2020-01-01

## 2020-01-01 RX ORDER — NICOTINE POLACRILEX 4 MG
15 LOZENGE BUCCAL PRN
Status: DISCONTINUED | OUTPATIENT
Start: 2020-01-01 | End: 2020-01-01 | Stop reason: SDUPTHER

## 2020-01-01 RX ORDER — PROPOFOL 10 MG/ML
10 INJECTION, EMULSION INTRAVENOUS
Status: DISCONTINUED | OUTPATIENT
Start: 2020-01-01 | End: 2020-01-01

## 2020-01-01 RX ORDER — POLYETHYLENE GLYCOL 3350 17 G/17G
17 POWDER, FOR SOLUTION ORAL DAILY PRN
Status: DISCONTINUED | OUTPATIENT
Start: 2020-01-01 | End: 2020-01-01 | Stop reason: HOSPADM

## 2020-01-01 RX ORDER — HEPARIN SODIUM 1000 [USP'U]/ML
1600 INJECTION, SOLUTION INTRAVENOUS; SUBCUTANEOUS PRN
Status: DISCONTINUED | OUTPATIENT
Start: 2020-01-01 | End: 2020-01-01 | Stop reason: HOSPADM

## 2020-01-01 RX ORDER — HEPARIN SODIUM 1000 [USP'U]/ML
1000 INJECTION, SOLUTION INTRAVENOUS; SUBCUTANEOUS ONCE
Status: DISCONTINUED | OUTPATIENT
Start: 2020-01-01 | End: 2020-01-01

## 2020-01-01 RX ORDER — HEPARIN SODIUM 5000 [USP'U]/ML
5000 INJECTION, SOLUTION INTRAVENOUS; SUBCUTANEOUS EVERY 8 HOURS SCHEDULED
Status: DISCONTINUED | OUTPATIENT
Start: 2020-01-01 | End: 2020-01-01 | Stop reason: HOSPADM

## 2020-01-01 RX ORDER — IPRATROPIUM BROMIDE AND ALBUTEROL SULFATE 2.5; .5 MG/3ML; MG/3ML
1 SOLUTION RESPIRATORY (INHALATION) EVERY 4 HOURS PRN
Status: DISCONTINUED | OUTPATIENT
Start: 2020-01-01 | End: 2020-01-01

## 2020-01-01 RX ORDER — MAGNESIUM SULFATE IN WATER 40 MG/ML
2 INJECTION, SOLUTION INTRAVENOUS ONCE
Status: COMPLETED | OUTPATIENT
Start: 2020-01-01 | End: 2020-01-01

## 2020-01-01 RX ORDER — FENTANYL CITRATE 50 UG/ML
50 INJECTION, SOLUTION INTRAMUSCULAR; INTRAVENOUS ONCE
Status: COMPLETED | OUTPATIENT
Start: 2020-01-01 | End: 2020-01-01

## 2020-01-01 RX ORDER — PROPOFOL 10 MG/ML
10 INJECTION, EMULSION INTRAVENOUS CONTINUOUS
Status: DISCONTINUED | OUTPATIENT
Start: 2020-01-01 | End: 2020-01-01

## 2020-01-01 RX ORDER — LEVOFLOXACIN 5 MG/ML
750 INJECTION, SOLUTION INTRAVENOUS EVERY 24 HOURS
Status: DISCONTINUED | OUTPATIENT
Start: 2020-01-01 | End: 2020-01-01

## 2020-01-01 RX ORDER — DEXTROSE MONOHYDRATE 50 MG/ML
100 INJECTION, SOLUTION INTRAVENOUS PRN
Status: DISCONTINUED | OUTPATIENT
Start: 2020-01-01 | End: 2020-01-01 | Stop reason: SDUPTHER

## 2020-01-01 RX ORDER — CALCIUM GLUCONATE 20 MG/ML
2 INJECTION, SOLUTION INTRAVENOUS ONCE
Status: COMPLETED | OUTPATIENT
Start: 2020-01-01 | End: 2020-01-01

## 2020-01-01 RX ORDER — MAGNESIUM SULFATE 1 G/100ML
2 INJECTION INTRAVENOUS ONCE
Status: COMPLETED | OUTPATIENT
Start: 2020-01-01 | End: 2020-01-01

## 2020-01-01 RX ORDER — MIDODRINE HYDROCHLORIDE 5 MG/1
5 TABLET ORAL
Status: DISCONTINUED | OUTPATIENT
Start: 2020-01-01 | End: 2020-01-01

## 2020-01-01 RX ORDER — PROMETHAZINE HYDROCHLORIDE 12.5 MG/1
12.5 TABLET ORAL EVERY 6 HOURS PRN
Status: DISCONTINUED | OUTPATIENT
Start: 2020-01-01 | End: 2020-01-01 | Stop reason: HOSPADM

## 2020-01-01 RX ORDER — PHENTOLAMINE MESYLATE 5 MG/1
5 INJECTION INTRAMUSCULAR; INTRAVENOUS ONCE
Status: COMPLETED | OUTPATIENT
Start: 2020-01-01 | End: 2020-01-01

## 2020-01-01 RX ORDER — NITROGLYCERIN 20 MG/100ML
20 INJECTION INTRAVENOUS CONTINUOUS
Status: DISCONTINUED | OUTPATIENT
Start: 2020-01-01 | End: 2020-01-01

## 2020-01-01 RX ORDER — DEXTROSE MONOHYDRATE 50 MG/ML
100 INJECTION, SOLUTION INTRAVENOUS PRN
Status: DISCONTINUED | OUTPATIENT
Start: 2020-01-01 | End: 2020-01-01 | Stop reason: HOSPADM

## 2020-01-01 RX ORDER — SODIUM CHLORIDE 0.9 % (FLUSH) 0.9 %
10 SYRINGE (ML) INJECTION EVERY 12 HOURS SCHEDULED
Status: DISCONTINUED | OUTPATIENT
Start: 2020-01-01 | End: 2020-01-01 | Stop reason: HOSPADM

## 2020-01-01 RX ORDER — ATORVASTATIN CALCIUM 10 MG/1
10 TABLET, FILM COATED ORAL DAILY
Status: DISCONTINUED | OUTPATIENT
Start: 2020-01-01 | End: 2020-01-01 | Stop reason: HOSPADM

## 2020-01-01 RX ORDER — CARVEDILOL 3.12 MG/1
3.12 TABLET ORAL 2 TIMES DAILY WITH MEALS
Status: DISCONTINUED | OUTPATIENT
Start: 2020-01-01 | End: 2020-01-01

## 2020-01-01 RX ORDER — PANTOPRAZOLE SODIUM 40 MG/1
40 TABLET, DELAYED RELEASE ORAL
Status: DISCONTINUED | OUTPATIENT
Start: 2020-01-01 | End: 2020-01-01

## 2020-01-01 RX ORDER — FENTANYL CITRATE 50 UG/ML
50 INJECTION, SOLUTION INTRAMUSCULAR; INTRAVENOUS
Status: DISCONTINUED | OUTPATIENT
Start: 2020-01-01 | End: 2020-01-01

## 2020-01-01 RX ORDER — ASPIRIN 81 MG/1
81 TABLET ORAL DAILY
Status: DISCONTINUED | OUTPATIENT
Start: 2020-01-01 | End: 2020-01-01 | Stop reason: HOSPADM

## 2020-01-01 RX ORDER — ACETAMINOPHEN 650 MG/1
650 SUPPOSITORY RECTAL EVERY 6 HOURS PRN
Status: DISCONTINUED | OUTPATIENT
Start: 2020-01-01 | End: 2020-01-01 | Stop reason: HOSPADM

## 2020-01-01 RX ORDER — DEXTROSE MONOHYDRATE 25 G/50ML
12.5 INJECTION, SOLUTION INTRAVENOUS PRN
Status: DISCONTINUED | OUTPATIENT
Start: 2020-01-01 | End: 2020-01-01 | Stop reason: HOSPADM

## 2020-01-01 RX ORDER — ALOGLIPTIN 12.5 MG/1
25 TABLET, FILM COATED ORAL DAILY
Status: DISCONTINUED | OUTPATIENT
Start: 2020-01-01 | End: 2020-01-01

## 2020-01-01 RX ORDER — GABAPENTIN 600 MG/1
300 TABLET ORAL DAILY
Status: DISCONTINUED | OUTPATIENT
Start: 2020-01-01 | End: 2020-01-01 | Stop reason: HOSPADM

## 2020-01-01 RX ORDER — MAGNESIUM SULFATE 1 G/100ML
1 INJECTION INTRAVENOUS ONCE
Status: COMPLETED | OUTPATIENT
Start: 2020-01-01 | End: 2020-01-01

## 2020-01-01 RX ORDER — 0.9 % SODIUM CHLORIDE 0.9 %
1000 INTRAVENOUS SOLUTION INTRAVENOUS ONCE
Status: COMPLETED | OUTPATIENT
Start: 2020-01-01 | End: 2020-01-01

## 2020-01-01 RX ORDER — DEXTROSE MONOHYDRATE 25 G/50ML
25 INJECTION, SOLUTION INTRAVENOUS ONCE
Status: COMPLETED | OUTPATIENT
Start: 2020-01-01 | End: 2020-01-01

## 2020-01-01 RX ORDER — IPRATROPIUM BROMIDE AND ALBUTEROL SULFATE 2.5; .5 MG/3ML; MG/3ML
1 SOLUTION RESPIRATORY (INHALATION) 4 TIMES DAILY
Status: DISCONTINUED | OUTPATIENT
Start: 2020-01-01 | End: 2020-01-01 | Stop reason: HOSPADM

## 2020-01-01 RX ORDER — ONDANSETRON 2 MG/ML
4 INJECTION INTRAMUSCULAR; INTRAVENOUS EVERY 6 HOURS PRN
Status: DISCONTINUED | OUTPATIENT
Start: 2020-01-01 | End: 2020-01-01 | Stop reason: HOSPADM

## 2020-01-01 RX ORDER — PROPOFOL 10 MG/ML
10 INJECTION, EMULSION INTRAVENOUS ONCE
Status: DISCONTINUED | OUTPATIENT
Start: 2020-01-01 | End: 2020-01-01

## 2020-01-01 RX ORDER — NICOTINE POLACRILEX 4 MG
15 LOZENGE BUCCAL PRN
Status: DISCONTINUED | OUTPATIENT
Start: 2020-01-01 | End: 2020-01-01 | Stop reason: HOSPADM

## 2020-01-01 RX ORDER — HEPARIN SODIUM 1000 [USP'U]/ML
1500 INJECTION, SOLUTION INTRAVENOUS; SUBCUTANEOUS PRN
Status: DISCONTINUED | OUTPATIENT
Start: 2020-01-01 | End: 2020-01-01 | Stop reason: HOSPADM

## 2020-01-01 RX ORDER — ALBUTEROL SULFATE 2.5 MG/3ML
2.5 SOLUTION RESPIRATORY (INHALATION) EVERY 4 HOURS PRN
Status: DISCONTINUED | OUTPATIENT
Start: 2020-01-01 | End: 2020-01-01 | Stop reason: HOSPADM

## 2020-01-01 RX ORDER — HEPARIN SODIUM 5000 [USP'U]/ML
1.9 INJECTION, SOLUTION INTRAVENOUS; SUBCUTANEOUS ONCE
Status: DISCONTINUED | OUTPATIENT
Start: 2020-01-01 | End: 2020-01-01

## 2020-01-01 RX ORDER — HEPARIN SODIUM 5000 [USP'U]/ML
1.6 INJECTION, SOLUTION INTRAVENOUS; SUBCUTANEOUS ONCE
Status: DISCONTINUED | OUTPATIENT
Start: 2020-01-01 | End: 2020-01-01

## 2020-01-01 RX ORDER — FUROSEMIDE 10 MG/ML
20 INJECTION INTRAMUSCULAR; INTRAVENOUS ONCE
Status: COMPLETED | OUTPATIENT
Start: 2020-01-01 | End: 2020-01-01

## 2020-01-01 RX ORDER — SODIUM CHLORIDE 0.9 % (FLUSH) 0.9 %
10 SYRINGE (ML) INJECTION PRN
Status: DISCONTINUED | OUTPATIENT
Start: 2020-01-01 | End: 2020-01-01 | Stop reason: HOSPADM

## 2020-01-01 RX ORDER — LORAZEPAM 2 MG/ML
INJECTION INTRAMUSCULAR
Status: DISCONTINUED
Start: 2020-01-01 | End: 2020-01-01

## 2020-01-01 RX ORDER — FUROSEMIDE 10 MG/ML
40 INJECTION INTRAMUSCULAR; INTRAVENOUS 2 TIMES DAILY
Status: DISCONTINUED | OUTPATIENT
Start: 2020-01-01 | End: 2020-01-01

## 2020-01-01 RX ADMIN — Medication 60 MCG/MIN: at 15:13

## 2020-01-01 RX ADMIN — PHENYLEPHRINE HYDROCHLORIDE 300 MCG/MIN: 10 INJECTION INTRAVENOUS at 04:45

## 2020-01-01 RX ADMIN — EPINEPHRINE 5 MCG/MIN: 1 INJECTION INTRAMUSCULAR; INTRAVENOUS; SUBCUTANEOUS at 02:20

## 2020-01-01 RX ADMIN — DEXTROSE MONOHYDRATE 25 G: 25 INJECTION, SOLUTION INTRAVENOUS at 23:05

## 2020-01-01 RX ADMIN — Medication: at 02:33

## 2020-01-01 RX ADMIN — ENOXAPARIN SODIUM 40 MG: 40 INJECTION SUBCUTANEOUS at 10:25

## 2020-01-01 RX ADMIN — ATORVASTATIN CALCIUM 10 MG: 10 TABLET, FILM COATED ORAL at 08:04

## 2020-01-01 RX ADMIN — INSULIN HUMAN 10 UNITS: 100 INJECTION, SOLUTION PARENTERAL at 01:45

## 2020-01-01 RX ADMIN — VASOPRESSIN 0.04 UNITS/MIN: 20 INJECTION INTRAVENOUS at 19:07

## 2020-01-01 RX ADMIN — PHENYLEPHRINE HYDROCHLORIDE 300 MCG/MIN: 10 INJECTION INTRAVENOUS at 01:59

## 2020-01-01 RX ADMIN — INSULIN LISPRO 9 UNITS: 100 INJECTION, SOLUTION INTRAVENOUS; SUBCUTANEOUS at 20:09

## 2020-01-01 RX ADMIN — PHENYLEPHRINE HYDROCHLORIDE 300 MCG/MIN: 10 INJECTION INTRAVENOUS at 16:49

## 2020-01-01 RX ADMIN — ALBUTEROL SULFATE 2.5 MG: 2.5 SOLUTION RESPIRATORY (INHALATION) at 23:58

## 2020-01-01 RX ADMIN — PHENYLEPHRINE HYDROCHLORIDE 100 MCG/MIN: 10 INJECTION INTRAVENOUS at 19:15

## 2020-01-01 RX ADMIN — FENTANYL CITRATE 50 MCG: 50 INJECTION, SOLUTION INTRAMUSCULAR; INTRAVENOUS at 19:44

## 2020-01-01 RX ADMIN — HEPARIN SODIUM 1500 UNITS: 1000 INJECTION INTRAVENOUS; SUBCUTANEOUS at 11:12

## 2020-01-01 RX ADMIN — ENOXAPARIN SODIUM 30 MG: 30 INJECTION SUBCUTANEOUS at 20:30

## 2020-01-01 RX ADMIN — PHENTOLAMINE MESYLATE 5 MG: 5 INJECTION, POWDER, FOR SOLUTION INTRAMUSCULAR; INTRAVENOUS at 20:21

## 2020-01-01 RX ADMIN — HEPARIN SODIUM 5000 UNITS: 5000 INJECTION INTRAVENOUS; SUBCUTANEOUS at 17:14

## 2020-01-01 RX ADMIN — ENOXAPARIN SODIUM 30 MG: 30 INJECTION SUBCUTANEOUS at 08:04

## 2020-01-01 RX ADMIN — SODIUM CHLORIDE: 9 INJECTION, SOLUTION INTRAVENOUS at 16:43

## 2020-01-01 RX ADMIN — PHENYLEPHRINE HYDROCHLORIDE 300 MCG/MIN: 10 INJECTION INTRAVENOUS at 22:17

## 2020-01-01 RX ADMIN — PHENYLEPHRINE HYDROCHLORIDE 300 MCG/MIN: 10 INJECTION INTRAVENOUS at 10:34

## 2020-01-01 RX ADMIN — INSULIN HUMAN 10 UNITS: 100 INJECTION, SOLUTION PARENTERAL at 02:54

## 2020-01-01 RX ADMIN — MAGNESIUM SULFATE HEPTAHYDRATE 1 G: 1 INJECTION, SOLUTION INTRAVENOUS at 11:33

## 2020-01-01 RX ADMIN — SODIUM CHLORIDE 500 ML: 9 INJECTION, SOLUTION INTRAVENOUS at 19:30

## 2020-01-01 RX ADMIN — Medication 60 MCG/MIN: at 10:19

## 2020-01-01 RX ADMIN — CEFTRIAXONE SODIUM 1 G: 1 INJECTION, POWDER, FOR SOLUTION INTRAMUSCULAR; INTRAVENOUS at 02:49

## 2020-01-01 RX ADMIN — GABAPENTIN 300 MG: 600 TABLET ORAL at 08:04

## 2020-01-01 RX ADMIN — HEPARIN SODIUM 5000 UNITS: 5000 INJECTION INTRAVENOUS; SUBCUTANEOUS at 21:15

## 2020-01-01 RX ADMIN — Medication 60 MCG/MIN: at 04:15

## 2020-01-01 RX ADMIN — ENOXAPARIN SODIUM 120 MG: 120 INJECTION SUBCUTANEOUS at 06:58

## 2020-01-01 RX ADMIN — IPRATROPIUM BROMIDE AND ALBUTEROL SULFATE 1 AMPULE: .5; 3 SOLUTION RESPIRATORY (INHALATION) at 15:26

## 2020-01-01 RX ADMIN — ALBUTEROL SULFATE 2.5 MG: 2.5 SOLUTION RESPIRATORY (INHALATION) at 22:47

## 2020-01-01 RX ADMIN — CALCIUM GLUCONATE 2 G: 20 INJECTION, SOLUTION INTRAVENOUS at 18:14

## 2020-01-01 RX ADMIN — SODIUM BICARBONATE 100 MEQ: 84 INJECTION, SOLUTION INTRAVENOUS at 02:57

## 2020-01-01 RX ADMIN — EZETIMIBE 10 MG: 10 TABLET ORAL at 08:05

## 2020-01-01 RX ADMIN — PHENYLEPHRINE HYDROCHLORIDE 300 MCG/MIN: 10 INJECTION INTRAVENOUS at 10:33

## 2020-01-01 RX ADMIN — PHENYLEPHRINE HYDROCHLORIDE 300 MCG/MIN: 10 INJECTION INTRAVENOUS at 00:56

## 2020-01-01 RX ADMIN — Medication 10 ML: at 20:17

## 2020-01-01 RX ADMIN — CALCIUM GLUCONATE 2 G: 20 INJECTION, SOLUTION INTRAVENOUS at 01:44

## 2020-01-01 RX ADMIN — Medication 2 MG/HR: at 20:36

## 2020-01-01 RX ADMIN — VASOPRESSIN 0.04 UNITS/MIN: 20 INJECTION INTRAVENOUS at 10:39

## 2020-01-01 RX ADMIN — INSULIN HUMAN 10 UNITS: 100 INJECTION, SOLUTION PARENTERAL at 03:22

## 2020-01-01 RX ADMIN — PHENYLEPHRINE HYDROCHLORIDE 300 MCG/MIN: 10 INJECTION INTRAVENOUS at 07:37

## 2020-01-01 RX ADMIN — PHENYLEPHRINE HYDROCHLORIDE 300 MCG/MIN: 10 INJECTION INTRAVENOUS at 19:57

## 2020-01-01 RX ADMIN — PHENTOLAMINE MESYLATE 5 MG: 5 INJECTION, POWDER, FOR SOLUTION INTRAMUSCULAR; INTRAVENOUS at 18:58

## 2020-01-01 RX ADMIN — LORAZEPAM 2 MG: 2 INJECTION INTRAMUSCULAR at 18:00

## 2020-01-01 RX ADMIN — HEPARIN SODIUM 1600 UNITS: 1000 INJECTION INTRAVENOUS; SUBCUTANEOUS at 16:03

## 2020-01-01 RX ADMIN — Medication 5 MCG/MIN: at 15:58

## 2020-01-01 RX ADMIN — NITROGLYCERIN 20 MCG/MIN: 20 INJECTION INTRAVENOUS at 05:27

## 2020-01-01 RX ADMIN — DEXTROSE MONOHYDRATE 25 G: 25 INJECTION, SOLUTION INTRAVENOUS at 02:55

## 2020-01-01 RX ADMIN — FUROSEMIDE 40 MG: 10 INJECTION, SOLUTION INTRAMUSCULAR; INTRAVENOUS at 10:59

## 2020-01-01 RX ADMIN — Medication 60 MCG/MIN: at 20:09

## 2020-01-01 RX ADMIN — HEPARIN SODIUM 1600 UNITS: 1000 INJECTION INTRAVENOUS; SUBCUTANEOUS at 11:13

## 2020-01-01 RX ADMIN — Medication 10 ML: at 08:04

## 2020-01-01 RX ADMIN — VANCOMYCIN HYDROCHLORIDE 1250 MG: 10 INJECTION, POWDER, LYOPHILIZED, FOR SOLUTION INTRAVENOUS at 11:05

## 2020-01-01 RX ADMIN — Medication 10 ML: at 20:23

## 2020-01-01 RX ADMIN — PROPOFOL INJECTABLE EMULSION 30 MCG/KG/MIN: 10 INJECTION, EMULSION INTRAVENOUS at 15:54

## 2020-01-01 RX ADMIN — VASOPRESSIN 0.04 UNITS/MIN: 20 INJECTION INTRAVENOUS at 03:15

## 2020-01-01 RX ADMIN — SODIUM CHLORIDE 10.29 UNITS/HR: 9 INJECTION, SOLUTION INTRAVENOUS at 02:20

## 2020-01-01 RX ADMIN — IPRATROPIUM BROMIDE AND ALBUTEROL SULFATE 1 AMPULE: .5; 3 SOLUTION RESPIRATORY (INHALATION) at 11:56

## 2020-01-01 RX ADMIN — ALBUMIN (HUMAN) 25 G: 12.5 INJECTION, SOLUTION INTRAVENOUS at 13:43

## 2020-01-01 RX ADMIN — FENTANYL CITRATE 50 MCG: 50 INJECTION, SOLUTION INTRAMUSCULAR; INTRAVENOUS at 15:48

## 2020-01-01 RX ADMIN — CEFTRIAXONE SODIUM 1 G: 1 INJECTION, POWDER, FOR SOLUTION INTRAMUSCULAR; INTRAVENOUS at 19:49

## 2020-01-01 RX ADMIN — Medication 60 MCG/MIN: at 00:33

## 2020-01-01 RX ADMIN — HEPARIN SODIUM 1500 UNITS: 1000 INJECTION INTRAVENOUS; SUBCUTANEOUS at 16:03

## 2020-01-01 RX ADMIN — EPINEPHRINE 30 MCG/MIN: 1 INJECTION INTRAMUSCULAR; INTRAVENOUS; SUBCUTANEOUS at 10:39

## 2020-01-01 RX ADMIN — INSULIN LISPRO 4 UNITS: 100 INJECTION, SOLUTION INTRAVENOUS; SUBCUTANEOUS at 12:31

## 2020-01-01 RX ADMIN — INSULIN HUMAN 10 UNITS: 100 INJECTION, SOLUTION PARENTERAL at 23:04

## 2020-01-01 RX ADMIN — HYDROCORTISONE SODIUM SUCCINATE 100 MG: 100 INJECTION, POWDER, FOR SOLUTION INTRAMUSCULAR; INTRAVENOUS at 20:22

## 2020-01-01 RX ADMIN — Medication 81 MG: at 08:05

## 2020-01-01 RX ADMIN — Medication 60 MCG/MIN: at 08:25

## 2020-01-01 RX ADMIN — FAMOTIDINE 20 MG: 10 INJECTION INTRAVENOUS at 21:39

## 2020-01-01 RX ADMIN — Medication: at 05:08

## 2020-01-01 RX ADMIN — ALBUTEROL SULFATE 2.5 MG: 2.5 SOLUTION RESPIRATORY (INHALATION) at 02:34

## 2020-01-01 RX ADMIN — SODIUM CHLORIDE 500 ML: 0.9 INJECTION, SOLUTION INTRAVENOUS at 15:57

## 2020-01-01 RX ADMIN — LEVOFLOXACIN 750 MG: 5 INJECTION, SOLUTION INTRAVENOUS at 12:50

## 2020-01-01 RX ADMIN — MAGNESIUM SULFATE HEPTAHYDRATE 2 G: 1 INJECTION, SOLUTION INTRAVENOUS at 02:07

## 2020-01-01 RX ADMIN — HYDROCORTISONE SODIUM SUCCINATE 100 MG: 100 INJECTION, POWDER, FOR SOLUTION INTRAMUSCULAR; INTRAVENOUS at 14:15

## 2020-01-01 RX ADMIN — HYDROCORTISONE SODIUM SUCCINATE 100 MG: 100 INJECTION, POWDER, FOR SOLUTION INTRAMUSCULAR; INTRAVENOUS at 04:39

## 2020-01-01 RX ADMIN — INSULIN HUMAN 10 UNITS: 100 INJECTION, SOLUTION PARENTERAL at 05:31

## 2020-01-01 RX ADMIN — MAGNESIUM SULFATE 2 G: 2 INJECTION INTRAVENOUS at 14:24

## 2020-01-01 RX ADMIN — SODIUM BICARBONATE 50 MEQ: 84 INJECTION, SOLUTION INTRAVENOUS at 04:15

## 2020-01-01 RX ADMIN — Medication: at 19:32

## 2020-01-01 RX ADMIN — ALBUTEROL SULFATE 2.5 MG: 2.5 SOLUTION RESPIRATORY (INHALATION) at 03:27

## 2020-01-01 RX ADMIN — Medication 20 MCG/MIN: at 16:25

## 2020-01-01 RX ADMIN — VASOPRESSIN 0.04 UNITS/MIN: 20 INJECTION INTRAVENOUS at 18:53

## 2020-01-01 RX ADMIN — SODIUM CHLORIDE 20.3 UNITS/HR: 9 INJECTION, SOLUTION INTRAVENOUS at 14:13

## 2020-01-01 RX ADMIN — CEFTRIAXONE SODIUM 2 G: 2 INJECTION, POWDER, FOR SOLUTION INTRAMUSCULAR; INTRAVENOUS at 18:29

## 2020-01-01 RX ADMIN — Medication 500 MG: at 15:14

## 2020-01-01 RX ADMIN — VASOPRESSIN 0.04 UNITS/MIN: 20 INJECTION INTRAVENOUS at 00:18

## 2020-01-01 RX ADMIN — IOPAMIDOL 85 ML: 755 INJECTION, SOLUTION INTRAVENOUS at 09:11

## 2020-01-01 RX ADMIN — VANCOMYCIN HYDROCHLORIDE 1250 MG: 10 INJECTION, POWDER, LYOPHILIZED, FOR SOLUTION INTRAVENOUS at 23:46

## 2020-01-01 RX ADMIN — PHENYLEPHRINE HYDROCHLORIDE 300 MCG/MIN: 10 INJECTION INTRAVENOUS at 22:58

## 2020-01-01 RX ADMIN — IPRATROPIUM BROMIDE AND ALBUTEROL SULFATE 1 AMPULE: .5; 3 SOLUTION RESPIRATORY (INHALATION) at 19:50

## 2020-01-01 RX ADMIN — Medication: at 03:23

## 2020-01-01 RX ADMIN — Medication: at 11:45

## 2020-01-01 RX ADMIN — HYDROXYZINE HYDROCHLORIDE 50 MG: 50 INJECTION, SOLUTION INTRAMUSCULAR at 05:28

## 2020-01-01 RX ADMIN — IPRATROPIUM BROMIDE AND ALBUTEROL SULFATE 1 AMPULE: .5; 3 SOLUTION RESPIRATORY (INHALATION) at 20:06

## 2020-01-01 RX ADMIN — SODIUM CHLORIDE 1000 ML: 9 INJECTION, SOLUTION INTRAVENOUS at 23:18

## 2020-01-01 RX ADMIN — CALCIUM GLUCONATE 2 G: 20 INJECTION, SOLUTION INTRAVENOUS at 13:52

## 2020-01-01 RX ADMIN — INSULIN LISPRO 12 UNITS: 100 INJECTION, SOLUTION INTRAVENOUS; SUBCUTANEOUS at 00:04

## 2020-01-01 RX ADMIN — EPINEPHRINE 30 MCG/MIN: 1 INJECTION INTRAMUSCULAR; INTRAVENOUS; SUBCUTANEOUS at 19:30

## 2020-01-01 RX ADMIN — SODIUM CHLORIDE 30.56 UNITS/HR: 9 INJECTION, SOLUTION INTRAVENOUS at 07:17

## 2020-01-01 RX ADMIN — FUROSEMIDE 20 MG: 10 INJECTION, SOLUTION INTRAMUSCULAR; INTRAVENOUS at 07:37

## 2020-01-01 RX ADMIN — Medication 500 MG: at 20:20

## 2020-01-01 RX ADMIN — IPRATROPIUM BROMIDE AND ALBUTEROL SULFATE 1 AMPULE: .5; 3 SOLUTION RESPIRATORY (INHALATION) at 07:41

## 2020-01-01 RX ADMIN — IPRATROPIUM BROMIDE AND ALBUTEROL SULFATE 1 AMPULE: .5; 3 SOLUTION RESPIRATORY (INHALATION) at 08:13

## 2020-01-01 ASSESSMENT — PAIN DESCRIPTION - FREQUENCY
FREQUENCY: CONTINUOUS

## 2020-01-01 ASSESSMENT — PULMONARY FUNCTION TESTS
PIF_VALUE: 29
PIF_VALUE: 24
PIF_VALUE: 32
PIF_VALUE: 35
PIF_VALUE: 36
PIF_VALUE: 34
PIF_VALUE: 26
PIF_VALUE: 44
PIF_VALUE: 41
PIF_VALUE: 33
PIF_VALUE: 37
PIF_VALUE: 37
PIF_VALUE: 45
PIF_VALUE: 39
PIF_VALUE: 40
PIF_VALUE: 23
PIF_VALUE: 40
PIF_VALUE: 38
PIF_VALUE: 36

## 2020-01-01 ASSESSMENT — PAIN DESCRIPTION - DESCRIPTORS
DESCRIPTORS: HEAVINESS
DESCRIPTORS: DISCOMFORT
DESCRIPTORS: ACHING

## 2020-01-01 ASSESSMENT — PAIN DESCRIPTION - PAIN TYPE
TYPE: ACUTE PAIN
TYPE: ACUTE PAIN
TYPE: ACUTE PAIN;CHRONIC PAIN
TYPE: CHRONIC PAIN

## 2020-01-01 ASSESSMENT — PAIN DESCRIPTION - ONSET
ONSET: ON-GOING
ONSET: SUDDEN
ONSET: ON-GOING

## 2020-01-01 ASSESSMENT — PAIN SCALES - GENERAL
PAINLEVEL_OUTOF10: 6
PAINLEVEL_OUTOF10: 10
PAINLEVEL_OUTOF10: 10
PAINLEVEL_OUTOF10: 5

## 2020-01-01 ASSESSMENT — PAIN DESCRIPTION - PROGRESSION
CLINICAL_PROGRESSION: RAPIDLY WORSENING
CLINICAL_PROGRESSION: NOT CHANGED
CLINICAL_PROGRESSION: NOT CHANGED

## 2020-01-01 ASSESSMENT — PAIN DESCRIPTION - LOCATION
LOCATION: CHEST
LOCATION: BACK
LOCATION: BACK;ABDOMEN
LOCATION: CHEST

## 2020-01-01 ASSESSMENT — PAIN DESCRIPTION - ORIENTATION
ORIENTATION: MID
ORIENTATION: LOWER
ORIENTATION: MID
ORIENTATION: LOWER;LEFT

## 2020-01-01 ASSESSMENT — PAIN - FUNCTIONAL ASSESSMENT
PAIN_FUNCTIONAL_ASSESSMENT: INTOLERABLE, UNABLE TO DO ANY ACTIVE OR PASSIVE ACTIVITIES
PAIN_FUNCTIONAL_ASSESSMENT: ACTIVITIES ARE NOT PREVENTED

## 2020-11-16 PROBLEM — R77.8 TROPONIN LEVEL ELEVATED: Status: ACTIVE | Noted: 2020-01-01

## 2020-11-16 PROBLEM — I50.23 ACUTE ON CHRONIC SYSTOLIC CONGESTIVE HEART FAILURE (HCC): Status: ACTIVE | Noted: 2020-01-01

## 2020-11-16 PROBLEM — I95.9 HYPOTENSION: Status: ACTIVE | Noted: 2020-01-01

## 2020-11-16 PROBLEM — R07.2 PRECORDIAL PAIN: Status: ACTIVE | Noted: 2020-01-01

## 2020-11-16 PROBLEM — J18.9 PNEUMONIA DUE TO INFECTIOUS ORGANISM: Status: ACTIVE | Noted: 2020-01-01

## 2020-11-16 PROBLEM — I50.9 HEART FAILURE (HCC): Status: ACTIVE | Noted: 2020-01-01

## 2020-11-16 PROBLEM — J96.01 ACUTE HYPOXEMIC RESPIRATORY FAILURE (HCC): Status: ACTIVE | Noted: 2020-01-01

## 2020-11-16 NOTE — PROGRESS NOTES
Occupational Therapy    Occupational Therapy Not Seen Note    DATE: 2020  Name: Jennyfer Foster  : 1940  MRN: [id number]    Patient not available for Occupational Therapy due to: Other: Pt is tachypneic, currently on Bipap with elevated trop at 38 trending up. RN agreeable to check on pts status tomorrow.     Next Scheduled Treatment: 2020    Electronically signed by ALEXIA Landry on 2020 at 10:53 AM

## 2020-11-16 NOTE — PROGRESS NOTES
PATIENT NAME: Sam Kelley RECORD NO. [id number]  DATE: 11/16/2020  ATTENDING PHYSICIAN:  Dr Payal Mcmullen: No primary provider on file. PREOPERATIVE DIAGNOSIS:  Need for blood pressure monitoring  POSTOPERATIVE DIAGNOSIS:  Same  PROCEDURE PERFORMED:  Radial Arterial Line Insertion  PERFORMING PHYSICIAN:  Socorro Lux MD  ANESTHESIA:  Sedation for mechanical ventilation  ESTIMATED BLOOD LOSS:  Less than 25 ml  COMPLICATIONS:  None immediately appreciated. DISCUSSION:  Agatha Overton is a 68 y.o. male who requires invasive pressure monitoring. The history and physical examination were reviewed and confirmed. Given the patient's condition, emergent consent was implied. PROCEDURE:  A timeout was initiated by the bedside nurse and was confirmed by those present. The patient was placed in a supine position. The skin overlying the Left Radial Artery was prepped with chlorhexadine. Through this region, the introducer needle through catheter was inserted into artery until pulsatile bright blood was seen in collection tubing. Guidewire was advanced with no resistance. Catheter was advanced into the artery, wire was pulled with brisk bleeding noted. Pressure monitoring setup was connected to the catheter, it aspirated and flushed easily. The catheter was secured to the wrist with 3-0 silk. No immediate complication was evident. All sponge, instrument and needle counts were correct at the completion of the procedure. Ultrasound visualized good collateral ulnar flow before insertion of the arterline    Socorro Lux MD  6:13 PM, 11/16/20    I was supervised and present for the entire procedure. Consent was obtained for both the central line and arterial line prior to insertion. I spoke with the patient's son Cholo Byers and explained need for as well as risks and benefits of both line placement. He verbally consented over the phone for both.     Electronically signed by

## 2020-11-16 NOTE — CARE COORDINATION
Case Management Initial Discharge Plan  [place],             Spoke with son Lucas Block to discuss discharge plans. Information verified: address, contacts, phone number, , insurance Yes    Emergency Contact/Next of Kin name & number: aren Alcazar [phone]    PCP: Sierra Daley MD (Inactive)  Date of last visit: 2-3 weeks ago    Insurance Provider: Nereida Lawton Dual    Discharge Planning    Living Arrangements:  Alone   Support Systems:  Friends/Neighbors, Children, Family Members    Home has 1 story mobile home  ramp to get into front door     Patient able to perform ADL's:Assisted    Current Services (outpatient & in home) home care but currently suspended  DME equipment: electric W/C, home O2, nebulizer, CGM system (continuous blood glucose monitoring)  DME provider: everettnt know    Receiving oral anticoagulation therapy? No    If indicated:   Physician managing anticoagulation treatment: n/a  Where does patient obtain lab work for ATC treatment? n/a      Potential Assistance Needed:       Patient agreeable to home care: Yes  Freedom of choice provided:  yes-had [facility] but services were suspended    Prior SNF/Rehab Placement and Facility: none  Agreeable to SNF/Rehab: TBD  Nemours of choice provided: no     Evaluation: no    Expected Discharge date:       Patient expects to be discharged to: Follow Up Appointment: Best Day/ Time:      Transportation provider: son and daughter in law  Transportation arrangements needed for discharge: No    Readmission Risk              Risk of Unplanned Readmission:        10             Does patient have a readmission risk score greater than 14?: No  If yes, follow-up appointment must be made within 7 days of discharge. Goals of Care:       Discharge Plan: LTAC vs SNF. Wojciech Andersen Pt lives alone and had [facility] home care but services got suspended due to bed bugs.  Called son after initial interview and left VM to see if this got taken care of.          Electronically signed by Alea Sawyer RN on 11/16/20 at 4:19 PM EST

## 2020-11-16 NOTE — PLAN OF CARE
Admission noted. Bed low, brakes on, siderails 2/4, and call light in reach. IV infusing, Bi-pap on. Legs elevated. Oriented to room and call light.

## 2020-11-16 NOTE — PROCEDURES
PROCEDURE NOTE - EMERGENCY INTUBATION    PATIENT NAME: Shane Carias  MEDICAL RECORD NO. [id number]  DATE: 11/16/2020  ATTENDING PHYSICIAN: Dr. Rosie Harper DIAGNOSIS:  Acute Respiratory Failure  POSTOPERATIVE DIAGNOSIS:  Same  PROCEDURE PERFORMED:  Emergency endotracheal intubation  PERFORMING PHYSICIAN: Wayne Murdock DO    MEDICATIONS: etomidate intravenously and succinycholine intravenously    DISCUSSION:  Shane Carias is a 68y.o.-year-old male who requires intubation and ventilatory support due to Respiratory failure. The history and physical examination were reviewed and confirmed. CONSENT: The patient provided verbal consent for this procedure. PROCEDURE:  A timeout was initiated by the bedside nurse and was confirmed by those present. The patient was placed in the appropriate position, supine with earlobe in line with sternal notch. Cricoid pressure was not required. Intubation was performed by direct laryngoscopy using a laryngoscope and a 7.5 cuffed endotracheal tube. The cuff was then inflated and the tube was secured appropriately at a distance of 24 cm to the dental ridge. Initial confirmation of placement included bilateral breath sounds, an end tidal CO2 detector, absence of sounds over the stomach and good color change. A chest x-ray to verify correct placement of the tube has been ordered but is still pending. The patient tolerated the procedure well. COMPLICATIONS:  None     Wayne Murdock DO   Attending Physician Statement  I have discussed the care of Shane Carias, including pertinent history and exam findings,  with the resident. I have seen and examined the patient and the key elements of all parts of the encounter have been performed by me. I agree with the assessment, plan and orders as documented by the resident with additions . Present   Treatment plan Discussed with nursing staff in detail , all questions answered .    Electronically signed by Nick Slade MD on   11/16/20 at 6:02 PM EST    Please note that this chart was generated using voice recognition Dragon dictation software. Although every effort was made to ensure the accuracy of this automated transcription, some errors in transcription may have occurred.      4:01 PM, 11/16/20

## 2020-11-16 NOTE — ED NOTES
Pt up to dangle at bedside per request.   Pt unable to get into recliner d/t dyspnea. Pt dangling with chain in front of pt to lean on. Pt c/o increasing chest pain returning with exertion.         Soni Galvez RN  11/16/20 [id number]

## 2020-11-16 NOTE — PROGRESS NOTES
Physical Therapy  DATE: 2020    NAME: Manuel López  MRN: [id number]   : 1940    Patient not seen this date for Physical Therapy due to:  [] Blood transfusion in progress  [] Hemodialysis  [] Patient Declined  [] Spine Precautions   [] Strict Bedrest  [] Surgery/ Procedure  [] Testing      [x] Other   Transfer to ICU cardiac difficulties        [] PT is being discontinued at this time. Patient independent. No further needs. [] PT is being discontinued at this time due to declining physical/ medical status. Therapy is not appropriate at this time.     Koffi Barney, PT

## 2020-11-16 NOTE — ED NOTES
Js RRT at bedside to initiate BiPAP per Dr. Stacia Dougherty verbal order.         Lukas Crowley RN  11/16/20 7438

## 2020-11-16 NOTE — ED PROVIDER NOTES
[facility]  ED  Emergency Department Encounter  Emergency Medicine Resident     Pt Name: Filemon London  MRN: [id number]  Tam[place]gfrafa 1940  Date of evaluation: 11/16/20  PCP:  Sondra Albert MD (Inactive)    CHIEF COMPLAINT       Chief Complaint   Patient presents with    Chest Pain       HISTORY OFPRESENT ILLNESS  (Location/Symptom, Timing/Onset, Context/Setting, Quality, Duration, Modifying Factors,Severity.)      Filemon London is a 68 y.o. male who presents via EMS for the complaints of chest pain difficulty breathing. Patient experienced left-sided chest pain which awoke him from sleep this morning approximately 3 hours prior to arrival per EMS patient was noted to be tachypneic in the 40s, oxygen saturation in the low 80s on room air. This improved to 94% on nonrebreather mask. Patient's initial blood pressure was 425 systolic. Initial twelve-lead EKG and squad showed bigeminy. Upon initial presentation patient is in significant respiratory distress, oxygen saturation is in the mid 70s on room air. Patient placed on BiPAP. Patient is unable to full speak secondary to respiratory distress. PAST MEDICAL / SURGICAL / SOCIAL / FAMILY HISTORY      has a past medical history of Back pain, CHF (congestive heart failure) ([address] DM (diabetes mellitus) ([address] Erectile dysfunction, GERD (gastroesophageal reflux disease), HTN (hypertension), and Hyperlipemia. has a past surgical history that includes Tonsillectomy.     Social History     Socioeconomic History    Marital status: Single     Spouse name: Not on file    Number of children: Not on file    Years of education: Not on file    Highest education level: Not on file   Occupational History    Not on file   Social Needs    Financial resource strain: Not on file    Food insecurity     Worry: Not on file     Inability: Not on file    Transportation needs     Medical: Not on file     Non-medical: Not on file   Tobacco Use    Smoking status: Never Smoker   Substance and Sexual Activity    Alcohol use: No    Drug use: No    Sexual activity: Not on file   Lifestyle    Physical activity     Days per week: Not on file     Minutes per session: Not on file    Stress: Not on file   Relationships    Social connections     Talks on phone: Not on file     Gets together: Not on file     Attends Scientologist service: Not on file     Active member of club or organization: Not on file     Attends meetings of clubs or organizations: Not on file     Relationship status: Not on file    Intimate partner violence     Fear of current or ex partner: Not on file     Emotionally abused: Not on file     Physically abused: Not on file     Forced sexual activity: Not on file   Other Topics Concern    Not on file   Social History Narrative    Not on file       Family History   Problem Relation Age of Onset    Heart Disease Mother     Cancer Father        Allergies:  Aleve [naproxen sodium] and Pcn [penicillins]    Home Medications:  Prior to Admission medications    Medication Sig Start Date End Date Taking? Authorizing Provider   NEURONTIN 600 MG tablet TAKE ONE-HALF TABLET BY MOUTH ONCE DAILY 8/9/13   Ankur Copeland MD   lisinopril (ZESTRIL) 40 MG tablet Take 1 tablet by mouth daily. 12/31/12   Ankur Copeland MD   omeprazole (PRILOSEC) 20 MG capsule Take 1 capsule by mouth daily. 12/11/12   Ankur Copeland MD   furosemide (LASIX) 20 MG tablet Take 1 tablet by mouth 2 times daily. 11/14/12   Ankur Copeland MD   ezetimibe (ZETIA) 10 MG tablet Take 1 tablet by mouth daily. 11/14/12   Ankur Copeland MD   sitaGLIPtan (JANUVIA) 100 MG tablet Take 1 tablet by mouth daily.  8/9/12   Landy Pena MD   PROAIR  (90 BASE) MCG/ACT inhaler INHALE TWO PUFFS BY MOUTH EVERY 4 TO 6 HOURS AS NEEDED FOR SHORTNESS OF BREATH 4/9/12   Elke Quigley MD   simvastatin (ZOCOR) 10 MG tablet Take 6 pills of zocor nightly 3/19/12   Elke Quigley MD   sitagliptan (Manas Goodpasture) 100 MG tablet Take 1 tablet by mouth daily. 1/20/12   Josh Garrido MD   hydrochlorothiazide (HYDRODIURIL) 25 MG tablet Take 1 tablet by mouth daily. 1/20/12   Josh Garrido MD   aspirin 81 MG EC tablet Take 1 tablet by mouth daily. 1/20/12   Josh Garrido MD   ibuprofen (ADVIL;MOTRIN) 800 MG tablet Take 1 tablet by mouth every 6 hours as needed. 1/20/12   Josh Garrido MD   diphenhydrAMINE (ALLERGY RELIEF) 25 MG capsule Take 25 mg by mouth every 6 hours as needed. Historical Provider, MD   hydrocortisone 1 % cream Apply  topically 2 times daily. Apply topically 2 times daily. Historical Provider, MD   Clotrimazole-Betamethasone (LOTRISONE EX) Apply  topically. Historical Provider, MD   lisinopril (ZESTRIL) 40 MG tablet Take 1 tablet by mouth daily for 360 days. 11/29/11 11/23/12  Faby Clayton,    Insulin Pen Needle ([employer] PEN NEEDLES 31G) 31G X 8 MM MISC by Does not apply route. 11/29/11   Veronica Silva MD   metformin (GLUCOPHAGE) 1000 MG tablet Take 1,000 mg by mouth 2 times daily (with meals). Historical Provider, MD   pioglitazone (ACTOS) 15 MG tablet Take 15 mg by mouth daily. Historical Provider, MD   clotrimazole (LOTRIMIN) 1 % cream Apply  topically 2 times daily. Apply topically 2 times daily. Historical Provider, MD       REVIEW OF SYSTEMS    (2-9 systems for level 4, 10 or more for level 5)      Review of Systems   Unable to perform ROS: Acuity of condition       PHYSICAL EXAM   (up to 7 for level 4, 8 or more for level 5)     INITIAL VITALS:    weight is 260 lb (117.9 kg). His axillary temperature is 98.9 °F (37.2 °C). His blood pressure is 129/65 and his pulse is 118. His respiration is 28 and oxygen saturation is 98%. Physical Exam  Vitals signs reviewed. Exam conducted with a chaperone present. Constitutional:       General: He is in acute distress. Appearance: He is ill-appearing and diaphoretic. HENT:      Head: Normocephalic and atraumatic.    Eyes:      Pupils: Pupils are equal, round, and reactive to light. Cardiovascular:      Rate and Rhythm: Regular rhythm. Tachycardia present. Heart sounds: No murmur. Pulmonary:      Effort: Respiratory distress present. Breath sounds: No stridor. No wheezing, rhonchi or rales. Abdominal:      General: There is no distension. Tenderness: There is no abdominal tenderness. Musculoskeletal:         General: Swelling present. Comments: There is a sleeve present on his right lower extremity. Skin:     General: Skin is warm. Capillary Refill: Capillary refill takes less than 2 seconds. Neurological:      Comments: GCS 15, unable to perform further neurologic exam secondary to respiratory distress. DIFFERENTIAL  DIAGNOSIS     PLAN (LABS / IMAGING / EKG):  Orders Placed This Encounter   Procedures    Rapid influenza A/B antigens    Culture, Blood 1    Culture, Blood 1    XR CHEST PORTABLE    CT CHEST PULMONARY EMBOLISM W CONTRAST    COVID-19    CBC WITH AUTO DIFFERENTIAL    BASIC METABOLIC PANEL    Brain Natriuretic Peptide    Lactate, Sepsis    Urinalysis Reflex to Culture    Troponin    Protime-INR    APTT    D-Dimer, Quantitative    Troponin    BIPAP    POC Blood Gas and Chemistry    EKG 12 Lead    EKG 12 Lead       MEDICATIONS ORDERED:  Orders Placed This Encounter   Medications    hydrOXYzine (VISTARIL) injection 50 mg    nitroGLYCERIN 50 mg in dextrose 5% 250 mL infusion    enoxaparin (LOVENOX) injection 120 mg       DDX: CHF exacerbation, COVID-19 infection, ACS, PE, pneumonia    Initial MDM/Plan: 68 y.o. male who presents with chest pain and respiratory distress of 2 hours duration. Oxygen saturation in the low 80s for the ambulance. Patient showed bigeminy on initial twelve-lead in the ambulance. Patient seen and examined. Patient is tachycardic, hypertensive, tachypneic, but afebrile.   Patient is in severe distress, unable to speak secondary to respiratory distress. Patient transitioned to BiPAP and given hydroxyzine to help tolerate, initial EKG performed as difficult to interpret given patient's agitation and inability to sit still. Will perform sepsis labs, Covid testing, cardiac work-up. Possible nitro drip to help with blood pressure. DIAGNOSTIC RESULTS / EMERGENCY DEPARTMENT COURSE / MDM     LABS:  Labs Reviewed   CBC WITH AUTO DIFFERENTIAL - Abnormal; Notable for the following components:       Result Value    WBC 21.5 (*)     Seg Neutrophils 91 (*)     Lymphocytes 4 (*)     Segs Absolute 19.44 (*)     Absolute Lymph # 0.75 (*)     All other components within normal limits   BASIC METABOLIC PANEL - Abnormal; Notable for the following components:    Glucose 220 (*)     All other components within normal limits   LACTATE, SEPSIS - Abnormal; Notable for the following components:    Lactic Acid, Sepsis, Whole Blood 3.1 (*)     All other components within normal limits   TROPONIN - Abnormal; Notable for the following components:    Troponin, High Sensitivity 36 (*)     All other components within normal limits   APTT - Abnormal; Notable for the following components:    PTT 19.8 (*)     All other components within normal limits   RAPID INFLUENZA A/B ANTIGENS   CULTURE, BLOOD 1   CULTURE, BLOOD 1   COVID-19   BRAIN NATRIURETIC PEPTIDE   PROTIME-INR   D-DIMER, QUANTITATIVE   LACTATE, SEPSIS   URINE RT REFLEX TO CULTURE   TROPONIN   POC BLOOD GAS AND CHEMISTRY         RADIOLOGY:  Xr Chest Portable    Result Date: 11/16/2020  EXAMINATION: ONE XRAY VIEW OF THE CHEST 11/16/2020 6:00 am COMPARISON: None. HISTORY: ORDERING SYSTEM PROVIDED HISTORY: SOB TECHNOLOGIST PROVIDED HISTORY: SOB FINDINGS: The heart is normal in size and configuration. The mediastinal contours are within normal limits. The lungs are well aerated. Pulmonary vasculature is mildly diffusely prominent. The pleural surfaces are normal and no evidence of a pleural effusion is seen.  Bones and soft tissues are unremarkable. Suggesting mild pulmonary vascular congestion. Otherwise, unremarkable single upright portable AP view of the chest.       EKG  Initial EKG unable to be interpreted secondary to tremors and respiratory distress while being taken. EKG Interpretation 2nd EKG 05:58    Interpreted by me    Rhythm: Regular  Rate: Tachycardic  Axis: Left  Ectopy: none  Conduction: Incomplete right bundle branch block  ST Segments: no acute change  T Waves: no acute change  Q Waves: none    Clinical Impression: Sinus tachycardia with left axis deviation and incomplete right bundle branch block      EMERGENCY DEPARTMENT COURSE:     29-year-old male presents to the emergency department via EMS in severe respiratory distress. Patient is tachypneic in the 40s on nonrebreather saturation 94% in the ambulance. Patient transitioned to BiPAP in the emergency department, sepsis labs, Covid swab, cardiac work-up obtained. Patient also started on nitro drip to improve blood pressure and respiratory status. Initial EKG obtained but difficult to interpret given patient's respiratory distress and tremors. Second EKG shows sinus tachycardia with incomplete right bundle branch block and left axis deviation. Initial lactic acid is 3.1, however given the patient's history of congestive heart failure and chest x-ray findings consistent with pulmonary vascular congestion decision made to hold off on fluid bolus. D-dimer is positive, plan to obtain CT PE. However patient's respiratory status has not improved to the point that I believe he may tolerate lying flat for a CT scan, so we will hold off on scan for now and prophylactically treat with 1 mg/kg of Lovenox. Patient reassessed, oxygen saturation 100% on BiPAP, with nitro drip at 50 mcgs/min. Troponin resulted and is elevated at 36, no known baseline. Spoke with [facility], they accepted admission of the patient.   Patient reassessed, lung sounds slightly diminished, will provide aerosol treatments for potential component of COPD exacerbation. Patient signed out to Dr. Malcom Guillaume awaiting admission. PROCEDURES:  None    CONSULTS:  None    CRITICAL CARE:  Please see attending note    FINAL IMPRESSION      1. Acute on chronic congestive heart failure, unspecified heart failure type ([facility] [address]          DISPOSITION / PLAN     DISPOSITION Decision To Admit 11/16/2020 06:31:55 AM        PATIENTREFERRED TO:  No follow-up provider specified.     DISCHARGE MEDICATIONS:  New Prescriptions    No medications on file       Surekha Lang DO  EmergencyMedicine Resident    (Please note that portions of this note were completed with a voice recognition program.  Efforts were made to edit the dictations but occasionally words are mis-transcribed.)        Surekha Lang DO  Resident  11/16/20 [id number]

## 2020-11-16 NOTE — PROGRESS NOTES
[address] [facility]  Speech Language Pathology    Date: 11/16/2020  Patient Name: Caden Sol  YOB: 1940   AGE: 68 y.o. MRN: [id number]        Patient Not Available for Speech Therapy     Due to:  [] Testing  [] Hemodialysis  [] Cancelled by RN  [] Surgery   [] Intubation/Sedation/Pain Medication  [] Medical instability  [x] Other:  Per RN pt. Being transferred to ICU for possible intubation    Next scheduled treatment:  As medically appropriate  Completed by:  Zeina Gonsales M.S. [id number] [facility]

## 2020-11-16 NOTE — CONSULTS
[facility]   Consult Note                 Date:   11/16/2020  Date of admission:  11/16/2020  5:12 AM  MRN:   [id number]  YOB: 1940    Reason for Consult: Elevated tropes    HISTORY OF PRESENT ILLNESS:    The patient is a 68 y.o.  male who is admitted to the hospital .  Patient was admitted with chest pain patient was found to very high blood pressure systolic 176 was started on IV nitro. Patient is also tachycardic hypoxic room air was 70 pulse ox  Patient is started on BiPAP at this time patient is unable to use lay down still complaining of short of breath left-sided pressure. Patient Tomasz Liz first was 47 now is 81. Denies any prior history of angina myocardial infarction congestive heart failure  Past Medical History:   has a past medical history of Back pain, CHF (congestive heart failure) ([facility] [address] DM (diabetes mellitus) ([facility] [address] Erectile dysfunction, GERD (gastroesophageal reflux disease), HTN (hypertension), and Hyperlipemia. Past Surgical History:   has a past surgical history that includes Tonsillectomy. Home Medications:    Prior to Admission medications    Medication Sig Start Date End Date Taking? Authorizing Provider   NEURONTIN 600 MG tablet TAKE ONE-HALF TABLET BY MOUTH ONCE DAILY 8/9/13   Edvin Prieto MD   lisinopril (ZESTRIL) 40 MG tablet Take 1 tablet by mouth daily. 12/31/12   Edvin Prieto MD   omeprazole (PRILOSEC) 20 MG capsule Take 1 capsule by mouth daily. 12/11/12   Edvin Prieto MD   furosemide (LASIX) 20 MG tablet Take 1 tablet by mouth 2 times daily. 11/14/12   Edvin Prieto MD   ezetimibe (ZETIA) 10 MG tablet Take 1 tablet by mouth daily. 11/14/12   Edvin Prieto MD   sitaGLIPtan (JANUVIA) 100 MG tablet Take 1 tablet by mouth daily.  8/9/12   Bob Mcrae MD   PROAIR  (90 BASE) MCG/ACT inhaler INHALE TWO PUFFS BY MOUTH EVERY 4 TO 6 HOURS AS NEEDED FOR SHORTNESS OF BREATH 4/9/12   Rachel Hull MD   simvastatin (ZOCOR) 10 MG tablet Take 6 pills of zocor nightly 3/19/12   Rodrick Leyden, MD   sitagliptan (JANUVIA) 100 MG tablet Take 1 tablet by mouth daily. 1/20/12   Rodrick Leyden, MD   hydrochlorothiazide (HYDRODIURIL) 25 MG tablet Take 1 tablet by mouth daily. 1/20/12   Rodrick Leyden, MD   aspirin 81 MG EC tablet Take 1 tablet by mouth daily. 1/20/12   Rodrick Leyden, MD   ibuprofen (ADVIL;MOTRIN) 800 MG tablet Take 1 tablet by mouth every 6 hours as needed. 1/20/12   Rodrick Leyden, MD   diphenhydrAMINE (ALLERGY RELIEF) 25 MG capsule Take 25 mg by mouth every 6 hours as needed. Historical Provider, MD   hydrocortisone 1 % cream Apply  topically 2 times daily. Apply topically 2 times daily. Historical Provider, MD Mackenziemazole-Betamethasone (LOTRISONE EX) Apply  topically. Historical Provider, MD   lisinopril (ZESTRIL) 40 MG tablet Take 1 tablet by mouth daily for 360 days. 11/29/11 11/23/12  Mick Rojas, DO   Insulin Pen Needle ([employer] PEN NEEDLES 31G) 31G X 8 MM MISC by Does not apply route. 11/29/11   Michael Estevez MD   metformin (GLUCOPHAGE) 1000 MG tablet Take 1,000 mg by mouth 2 times daily (with meals). Historical Provider, MD   pioglitazone (ACTOS) 15 MG tablet Take 15 mg by mouth daily. Historical Provider, MD   clotrimazole (LOTRIMIN) 1 % cream Apply  topically 2 times daily. Apply topically 2 times daily.      Historical Provider, MD       Current Medications: Scheduled Meds:   aspirin  81 mg Oral Daily    ezetimibe  10 mg Oral Daily    gabapentin  300 mg Oral Daily    [START ON 11/17/2020] pantoprazole  40 mg Oral QAM AC    pioglitazone  15 mg Oral Daily    atorvastatin  10 mg Oral Daily    alogliptin  25 mg Oral Daily    sodium chloride flush  10 mL Intravenous 2 times per day    [START ON 11/17/2020] lisinopril  5 mg Oral Daily    [START ON 11/17/2020] carvedilol  3.125 mg Oral BID WC    furosemide  40 mg Intravenous BID    enoxaparin  30 mg Subcutaneous BID    levofloxacin  750 mg Intravenous Q24H    midodrine  5 mg Oral TID     insulin lispro  0-12 Units Subcutaneous TID     insulin lispro  0-6 Units Subcutaneous Nightly     Continuous Infusions:   nitroGLYCERIN Stopped (11/16/20 1027)    dextrose       PRN Meds:.albuterol, sodium chloride flush, acetaminophen **OR** acetaminophen, polyethylene glycol, promethazine **OR** ondansetron, nicotine, glucose, dextrose, glucagon (rDNA), dextrose, perflutren lipid microspheres, ipratropium-albuterol     Allergies:  Aleve [naproxen sodium] and Pcn [penicillins]    Social History:   reports that he has never smoked. He does not have any smokeless tobacco history on file. He reports that he does not drink alcohol or use drugs. Family History: family history includes Cancer in his father; Heart Disease in his mother. Review of Systems   CONSTITUTIONAL:  negative for fevers, chills, positive fatigue and malaise    EYES:  negative for discharge    HEENT:  negative for epistaxis and sore throat    RESPIRATORY:   Positive for  shortness of breath, wheezing    CARDIOVASCULAR:   As above chest pain, palpitations, syncope, edema    GASTROINTESTINAL:  negative for nausea, vomiting, diarrhea, constipation, abdominal pain    GENITOURINARY:  negative for incontinence    MUSCULOSKELETAL:  negative for neck or back pain    NEUROLOGICAL:  negative for headaches, seizures and double vision   PSYCHIATRIC:  negative               PHYSICAL EXAM:    Blood pressure 94/68, pulse 106, temperature 98.9 °F (37.2 °C), temperature source Axillary, resp. rate 30, weight 244 lb 11.4 oz (111 kg), SpO2 97 %.   Quite short of breath on BiPAP  HEAD: normocephalic, atraumatic   EYES: PERRLA, EOMI   ENT: moist mucous membranes, uvula midline   NECK:  symmetric, no midline tenderness to palpation   LUNGS: Reduced to auscultation bilaterally   CARDIOVASCULAR: regular rate and rhythm, no murmurs, rubs or gallops   ABDOMEN: Soft, non-tender, non-distended with normal active bowel sounds   SKIN: no rash       DATA:    ECG: Sinus tachycardia with right bundle branch block  Echo: Ordered stat echo   stress:  Cath:    Labs:     CBC:   Recent Labs     11/16/20 0520   WBC 21.5*   HGB 14.0   HCT 44.8        BMP:   Recent Labs     11/16/20 0518 11/16/20 0520   NA  --  136   K  --  4.6   CO2  --  20   BUN  --  22   CREATININE CANNOT BE CALCULATED 1.08   LABGLOM CANNOT BE CALCULATED >60   GLUCOSE  --  220*     BNP: No results for input(s): BNP in the last 72 hours. PT/INR:   Recent Labs     11/16/20 0520   PROTIME 10.1   INR 1.0     APTT:  Recent Labs     11/16/20 0520   APTT 19.8*     CARDIAC ENZYMES:No results for input(s): CKTOTAL, CKMB, CKMBINDEX, TROPONINI in the last 72 hours. FASTING LIPID PANEL:  Lab Results   Component Value Date    HDL 33 12/20/2011    TRIG 181 12/20/2011     LIVER PROFILE:No results for input(s): AST, ALT, LABALBU in the last 72 hours. No intake or output data in the 24 hours ending 11/16/20 1303    IMPRESSION:    Patient Active Problem List   Diagnosis    Back pain    GERD (gastroesophageal reflux disease)    HTN (hypertension)    DM (diabetes mellitus) ([facility] [address]    Hyperlipemia    Erectile dysfunction    CHF (congestive heart failure) (HCC)    Hypercalcemia    Heart failure (HCC)    Acute on chronic systolic congestive heart failure (HCC)    Pneumonia due to infectious organism    Acute hypoxemic respiratory failure (HCC)    Troponin level elevated    Hypotension-possibly due to nitro drip    Precordial pain           RECOMMENDATIONS:  NSTEMI, patient blood pressure has been dropped on IV nitro before now is around 100 at this time.   Also aggravated with severe hypoxia on BiPAP pulse ox is still on the lower side  Patient may need intubate sedated  Will do pulmonary critical consult and transferred to MICU  Will trend the tropes, on Lovenox, please watch H&H and renal functions if BUN and creatinine increases then it should be changed to heparin  Stat echo to evaluate LVEF and segmental wall motion abnormalities  Blood pressure was on the higher side now on the lower possible secondary to coronary ischemic insult  Patient need heart cath but at this time patient cannot lay down patient need to be intubated sedated  High D-dimer but negative PE  Tachycardia with right bundle branch block no old EKG to compare with  CHF, systolic versus diastolic, cor pulmonale,  Patient was given Lasix earlier no after putting the [facility] catheter 1 L is out  Patient still critical  Plan for the heart cath once patient is stable      Discussed with patient and nursing.     [place]        [phone]

## 2020-11-16 NOTE — ED PROVIDER NOTES
[facility] ED  Emergency Department  Faculty Attestation       I performed a history and physical examination of the patient and discussed management with the resident. I reviewed the residents note and agree with the documented findings including all diagnostic interpretations and plan of care. Any areas of disagreement are noted on the chart. I was personally present for the key portions of any procedures. I have documented in the chart those procedures where I was not present during the key portions. I have reviewed the emergency nurses triage note. I agree with the chief complaint, past medical history, past surgical history, allergies, medications, social and family history as documented unless otherwise noted below. Documentation of the HPI, Physical Exam and Medical Decision Making performed by scribandreia is based on my personal performance of the HPI, PE and MDM. For Physician Assistant/ Nurse Practitioner cases/documentation I have personally evaluated this patient and have completed at least one if not all key elements of the E/M (history, physical exam, and MDM). Additional findings are as noted. Pertinent Comments     Primary Care Physician: Martina Hopkins MD (Inactive)    ED Triage Vitals   BP Temp Temp Source Pulse Resp SpO2 Height Weight   11/16/20 0519 11/16/20 0513 11/16/20 0513 11/16/20 0515 11/16/20 0513 11/16/20 0515 -- 11/16/20 0513   (!) 164/74 98.9 °F (37.2 °C) Axillary 138 (!) 50 (!) 76 %  260 lb (117.9 kg)        History/Physical: This is a 68 y.o. male who presents to the Emergency Department with complaint of chest pain and shortness of breath. Started suddenly per EMS. Was given nitro and placed on NRB prior to arrival     On exam patient is tachypneic and in acute respiratory distress. However he is answering questions. He is tachycardic with a regular rhythm. Auscultated his heart rate to be 114. He is having increased work of breathing throughout. Decreased air entry throughout, but no obvious crackles or wheezes however difficult to auscultate. Abdomen soft nontender. He does have some mild pitting edema bilateral lower extremities. MDM/Plan: Acute respiratory distress as well as chest pain. EKG with undetermined rhythm. Concern for possible CHF exacerbation versus Covid versus other viral infection. Will get basic labs and chest x-ray. Patient's white count was elevated, therefore determined to get cultures and lactic at this time. However no infectious source at this time. Therefore will hold on doing antibiotics. Nitro drip. Vistaril to help tolerate BiPAP    Patient is likely consistent with CHF. BNP is normal, but chest x-ray consistent. He is improved on nitro and BiPAP    EKG Interpretation    Interpreted by emergency department physician    Clinical Impression: Undetermined rhythm w/ nonspecific intraventricular block. Poor quality. Benny Mejia    EKG Interpretation    Interpreted by emergency department physician    Clinical Impression: pulmonary pattern with RBBB. No ischmeia    Benny Mejia      CRITICAL CARE: There was significant risk of life threatening deterioration of patient's condition requiring my direct management. Critical care time 15 minutes, excluding any documented procedures.       Benny Mejia MD  Attending Emergency Physician      Benny Mejia MD  11/16/20 [id number]

## 2020-11-16 NOTE — ED NOTES
Report given to RN taking care of patient. No further questions - with understanding of patient.           Jose Dozier, RN  11/16/20 [id number]

## 2020-11-16 NOTE — PROGRESS NOTES
Ventilator Bronchodilator assessment    Breath sounds: rhonchi  Inspiratory Pressure: 26  Plateau Pressure: 20    Patient assessed at level 3          []    Bronchodilator Assessment    BRONCHODILATOR ASSESSMENT SCORE  Score 0 (Home) 1 2 3 4   Breath Sounds   []  Chronic Ventilator: Patient at baseline [x]  Mild Wheezes/ Clear []  Intermittent wheezes with good air entry []  Bilateral/unilateral wheezing with diminished air entry []  Insp/Exp wheeze and/or poor aeration   Ventilator Pressures   []  Chronic Ventilator []  Insp. Pressure less than 25 cm H20 []  Insp. Pressure less than 25 cm H20 [x]  Insp. Pressure exceeds 25 cm H20 []  Insp.  Pressure exceeds 30 cm H20   Plateau Pressure []  NA   []  Plateau Pressure less than 4  []  Plateau Pressure less than or equal to 5 [x]  Plateau Pressure greater than or equal to 6 []  Plateau Pressure greater than or equal to 8       CONI MUKHERJEE  4:13 PM

## 2020-11-16 NOTE — H&P
[facility]  Office: [address], DO, Shae Mancuso, DO, Marquis Watkins, DO, Saiar Sandoval Blood, DO, Pascual Garibay MD, Cooper Holt MD, Jackie Castro MD, Ramon Palomares MD, Darrian Galeano MD, Beryle Marseille, MD, Ciro Pallas, MD, Sandip Gomes MD, Yolanda Sotomayor MD, Puneet Machado, DO, Stefanie Mejia MD, Pavithra Mcguire MD, Kerry Torres DO, Ivan Delgado MD,  Bernardo Balderas DO, Marin Arzola MD, Mi Bhakta MD, Ines Keenan, [facility], [facility], [facility], Rahul Slater, CNP, Jakob Ray, CNS, Tulio Bender, CNP, Kera Shrestha, CNP, Hilaria Soto, CNP, Nadira Tatum, CNP, Dav Puentes, CNP, Lorraine Mayer PA-C, Mortimer Guardian, [facility], Ion Gao, CNP, Lavonne Frankel, CNP, Bhavna Hendrickson, CNP, Floyd Monzon, CNP, Estrada Figueroa, [facility]    HISTORY AND PHYSICAL EXAMINATION            Date:   11/16/2020  Patient name:  Rodriguez Pepe  Date of admission:  11/16/2020  5:12 AM  MRN:   [id number]  Account:  [de-identified]  YOB: 1940  PCP:    Lianne Latif MD (Inactive)  Room:   6071/9178-98  Code Status:    Full Code    Chief Complaint:     Chief Complaint   Patient presents with    Chest Pain       History Obtained From:     patient, electronic medical record    History of Present Illness:     Admitted through ER with following information:    Rodriguez Pepe is a 68 y.o. male who presents via EMS for the complaints of chest pain difficulty breathing. Patient experienced left-sided chest pain which awoke him from sleep this morning approximately 3 hours prior to arrival per EMS patient was noted to be tachypneic in the 40s, oxygen saturation in the low 80s on room air. This improved to 94% on nonrebreather mask. Patient's initial blood pressure was 607 systolic. Initial twelve-lead EKG and squad showed bigeminy.   Upon initial presentation patient is in significant respiratory distress, oxygen saturation is in the mid 70s on room air. Patient placed on BiPAP. Patient is unable to full speak secondary to respiratory distress    Patient was transferred to stepdown  Is still on BiPAP and is unable to talk much  D-dimer was high due to which CTA chest was done which was negative for PE however it also shows pneumonia on the left side  Lactic acid 3.1-2.0  Serial troponins have been ordered first troponin 38  He is diabetic, current blood glucose 220  CBC shows leukocytosis with WBCs of 21.5  Covid test is negative  Past Medical History:     Past Medical History:   Diagnosis Date    Back pain     [facility]est[facility] [address]     DM (diabetes mellitus) ([facility] [address] [facility]     Erectile dysfunction     GERD (gastroesophageal reflux disease)     HTN (hypertension)     Hyperlipemia         Past Surgical History:     Past Surgical History:   Procedure Laterality Date    TONSILLECTOMY          Medications Prior to Admission:     Prior to Admission medications    Medication Sig Start Date End Date Taking? Authorizing Provider   NEURONTIN 600 MG tablet TAKE ONE-HALF TABLET BY MOUTH ONCE DAILY 8/9/13   Esau Pavon MD   lisinopril (ZESTRIL) 40 MG tablet Take 1 tablet by mouth daily. 12/31/12   Esau Pavon MD   omeprazole (PRILOSEC) 20 MG capsule Take 1 capsule by mouth daily. 12/11/12   Esau Pavon MD   furosemide (LASIX) 20 MG tablet Take 1 tablet by mouth 2 times daily. 11/14/12   Esau Pavon MD   ezetimibe (ZETIA) 10 MG tablet Take 1 tablet by mouth daily. 11/14/12   Esau Pavon MD   sitaGLIPtan (JANUVIA) 100 MG tablet Take 1 tablet by mouth daily. 8/9/12   Loly Paris MD   PROAIR  (90 BASE) MCG/ACT inhaler INHALE TWO PUFFS BY MOUTH EVERY 4 TO 6 HOURS AS NEEDED FOR SHORTNESS OF BREATH 4/9/12   Josh Garrido MD   simvastatin (ZOCOR) 10 MG tablet Take 6 pills of zocor nightly 3/19/12   Josh Garrido MD   sitagliptan (JANUVIA) 100 MG tablet Take 1 tablet by mouth daily.  1/20/12   Josh Garrido MD hydrochlorothiazide (HYDRODIURIL) 25 MG tablet Take 1 tablet by mouth daily. 1/20/12   Radha Pineda MD   aspirin 81 MG EC tablet Take 1 tablet by mouth daily. 1/20/12   Radha Pineda MD   ibuprofen (ADVIL;MOTRIN) 800 MG tablet Take 1 tablet by mouth every 6 hours as needed. 1/20/12   Radha Pineda MD   diphenhydrAMINE (ALLERGY RELIEF) 25 MG capsule Take 25 mg by mouth every 6 hours as needed. Historical Provider, MD   hydrocortisone 1 % cream Apply  topically 2 times daily. Apply topically 2 times daily. Historical Provider, MD   Clotrimazole-Betamethasone (LOTRISONE EX) Apply  topically. Historical Provider, MD   lisinopril (ZESTRIL) 40 MG tablet Take 1 tablet by mouth daily for 360 days. 11/29/11 11/23/12  Destinee Krause,    Insulin Pen Needle ([employer] PEN NEEDLES 31G) 31G X 8 MM MISC by Does not apply route. 11/29/11   Modesta Sofia MD   metformin (GLUCOPHAGE) 1000 MG tablet Take 1,000 mg by mouth 2 times daily (with meals). Historical Provider, MD   pioglitazone (ACTOS) 15 MG tablet Take 15 mg by mouth daily. Historical Provider, MD   clotrimazole (LOTRIMIN) 1 % cream Apply  topically 2 times daily. Apply topically 2 times daily. Historical Provider, MD        Allergies:     Aleve [naproxen sodium] and Pcn [penicillins]    Social History:     Tobacco:    reports that he has never smoked. He does not have any smokeless tobacco history on file. Alcohol:      reports no history of alcohol use. Drug Use:  reports no history of drug use. Family History:     Family History   Problem Relation Age of Onset    Heart Disease Mother     Cancer Father        Review of Systems:   Not very reliable as patient is short of breath and is unable to speak much  Positive and Negative as described in HPI.     CONSTITUTIONAL:  negative for fevers, chills, sweats, fatigue, weight loss  HEENT:  negative for vision, hearing changes, runny nose, throat pain  RESPIRATORY:  + for shortness of breath, cough, congestion, denies wheezing  CARDIOVASCULAR: + chest pain, denies palpitations  GASTROINTESTINAL:  negative for nausea, vomiting, diarrhea, constipation, change in bowel habits, abdominal pain   GENITOURINARY:  negative for difficulty of urination, burning with urination, frequency   INTEGUMENT:  negative for rash, skin lesions, easy bruising   HEMATOLOGIC/LYMPHATIC:  + for swelling/edema   ALLERGIC/IMMUNOLOGIC:  negative for urticaria , itching  ENDOCRINE:  negative increase in drinking, increase in urination, hot or cold intolerance  MUSCULOSKELETAL:  negative joint pains, muscle aches, swelling of joints  NEUROLOGICAL:  negative for headaches, dizziness, lightheadedness, numbness, pain, tingling extremities  BEHAVIOR/PSYCH:  negative for depression, anxiety    Physical Exam:   BP 94/68   Pulse 106   Temp 98.9 °F (37.2 °C) (Axillary)   Resp 30   Wt 244 lb 11.4 oz (111 kg)   SpO2 97%   Temp (24hrs), Av.9 °F (37.2 °C), Min:98.9 °F (37.2 °C), Max:98.9 °F (37.2 °C)    Recent Labs     20  0518 20  1227   POCGLU 240* 230*     No intake or output data in the 24 hours ending 20 1240    General Appearance: alert, still in respiratory distress and is on BiPAP   mental status: oriented to person, place, and time, not able to talk much due to shortness of breath  Head: normocephalic, atraumatic  Eye: no icterus, redness, pupils equal and reactive, extraocular eye movements intact, conjunctiva clear  Ear: normal external ear, no discharge, hearing intact  Nose: no drainage noted  Mouth: mucous membranes moist  Neck: supple, no carotid bruits, thyroid not palpable  Lungs:  Tachypneic,+ bibasilar Rales  Cardiovascular: Mildly tachycardic, regular rhythm, no murmur, gallop, rub  Abdomen: Soft, nontender, nondistended, normal bowel sounds, no hepatomegaly or splenomegaly  Neurologic: There are no new focal motor or sensory deficits, normal muscle tone and bulk, no abnormal sensation, normal speech, cranial nerves II through XII grossly intact  Skin: No gross lesions, rashes, bruising or bleeding on exposed skin area  Extremities: peripheral pulses palpable, + pedal edema no calf pain with palpation  Psych: Anxious affect    Investigations:      Laboratory Testing:  Recent Results (from the past 24 hour(s))   Venous Blood Gas, POC    Collection Time: 11/16/20  5:18 AM   Result Value Ref Range    pH, Demetrius 7.244 (L) 7.320 - 7.430    pCO2, Demetrius 60.6 (H) 41.0 - 51.0 mm Hg    pO2, Demetrius 19.9 (L) 30.0 - 50.0 mm Hg    HCO3, Venous 26.2 22.0 - 29.0 mmol/L    Total CO2, Venous 28 23.0 - 30.0 mmol/L    Negative Base Excess, Demetrius 3 (H) 0.0 - 2.0    Positive Base Excess, Demetrius NOT REPORTED 0.0 - 3.0    O2 Sat, Demetrius 23 (L) 60.0 - 85.0 %    O2 Device/Flow/% NOT REPORTED     Axel Test NOT REPORTED     Sample Site NOT REPORTED     Mode NOT REPORTED     FIO2 NOT REPORTED     Pt Temp NOT REPORTED     POC pH Temp NOT REPORTED     POC pCO2 Temp NOT REPORTED mm Hg    POC pO2 Temp NOT REPORTED mm Hg   Hemoglobin and hematocrit, blood    Collection Time: 11/16/20  5:18 AM   Result Value Ref Range    POC Hemoglobin CANNOT BE CALCULATED 13.5 - 17.5 g/dL    POC Hematocrit CANNOT BE CALCULATED 41 - 53 %   Creatinine W/GFR Point of Care    Collection Time: 11/16/20  5:18 AM   Result Value Ref Range    POC Creatinine CANNOT BE CALCULATED 0.51 - 1.19 mg/dL    GFR Comment CANNOT BE CALCULATED >60 mL/min    GFR Non- CANNOT BE CALCULATED >60 mL/min    GFR Comment         SODIUM (POC)    Collection Time: 11/16/20  5:18 AM   Result Value Ref Range    POC Sodium Result not available.  138 - 146 mmol/L   POTASSIUM (POC)    Collection Time: 11/16/20  5:18 AM   Result Value Ref Range    POC Potassium 4.7 (H) 3.5 - 4.5 mmol/L   CHLORIDE (POC)    Collection Time: 11/16/20  5:18 AM   Result Value Ref Range    POC Chloride 103 98 - 107 mmol/L   CALCIUM, IONIC (POC)    Collection Time: 11/16/20  5:18 AM   Result Value Ref Range    POC Ionized Brain Natriuretic Peptide    Collection Time: 11/16/20  5:20 AM   Result Value Ref Range    Pro- <300 pg/mL    BNP Interpretation Pro-BNP Reference Range:    Troponin    Collection Time: 11/16/20  5:20 AM   Result Value Ref Range    Troponin, High Sensitivity 36 (H) 0 - 22 ng/L    Troponin T NOT REPORTED <0.03 ng/mL    Troponin Interp NOT REPORTED    Protime-INR    Collection Time: 11/16/20  5:20 AM   Result Value Ref Range    Protime 10.1 9.0 - 12.0 sec    INR 1.0    APTT    Collection Time: 11/16/20  5:20 AM   Result Value Ref Range    PTT 19.8 (L) 20.5 - 30.5 sec   D-Dimer, Quantitative    Collection Time: 11/16/20  5:20 AM   Result Value Ref Range    D-Dimer, Quant 1.03 mg/L FEU   COVID-19    Collection Time: 11/16/20  5:29 AM    Specimen: Other   Result Value Ref Range    SARS-CoV-2          SARS-CoV-2, Rapid Not Detected Not Detected    Source . NASOPHARYNGEAL SWAB     SARS-CoV-2         Culture, Blood 1    Collection Time: 11/16/20  5:40 AM    Specimen: Blood   Result Value Ref Range    Specimen Description . BLOOD     Special Requests RT HAND 10 ML     Culture NO GROWTH 4 HOURS    Lactate, Sepsis    Collection Time: 11/16/20  5:48 AM   Result Value Ref Range    Lactic Acid, Sepsis NOT REPORTED 0.5 - 1.9 mmol/L    Lactic Acid, Sepsis, Whole Blood 3.1 (H) 0.5 - 1.9 mmol/L   Culture, Blood 1    Collection Time: 11/16/20  5:50 AM    Specimen: Blood   Result Value Ref Range    Specimen Description . BLOOD     Special Requests RT FA 1 ML     Culture NO GROWTH 2 HOURS    EKG 12 Lead    Collection Time: 11/16/20  5:58 AM   Result Value Ref Range    Ventricular Rate 121 BPM    Atrial Rate 121 BPM    P-R Interval 188 ms    QRS Duration 94 ms    Q-T Interval 298 ms    QTc Calculation (Bazett) 423 ms    P Axis 96 degrees    R Axis -71 degrees    T Axis 77 degrees   Troponin    Collection Time: 11/16/20  6:32 AM   Result Value Ref Range    Troponin, High Sensitivity 38 (H) 0 - 22 ng/L    Troponin T NOT REPORTED <0.03 ng/mL    Troponin Interp NOT REPORTED    TSH without Reflex    Collection Time: 11/16/20  6:32 AM   Result Value Ref Range    TSH 0.85 0.30 - 5.00 mIU/L   Lactate, Sepsis    Collection Time: 11/16/20  7:35 AM   Result Value Ref Range    Lactic Acid, Sepsis NOT REPORTED 0.5 - 1.9 mmol/L    Lactic Acid, Sepsis, Whole Blood 1.9 0.5 - 1.9 mmol/L   Troponin    Collection Time: 11/16/20 11:00 AM   Result Value Ref Range    Troponin, High Sensitivity 70 (HH) 0 - 22 ng/L    Troponin T NOT REPORTED <0.03 ng/mL    Troponin Interp NOT REPORTED    Lactate, Sepsis    Collection Time: 11/16/20 11:00 AM   Result Value Ref Range    Lactic Acid, Sepsis NOT REPORTED 0.5 - 1.9 mmol/L    Lactic Acid, Sepsis, Whole Blood 2.0 (H) 0.5 - 1.9 mmol/L   POC Glucose Fingerstick    Collection Time: 11/16/20 12:27 PM   Result Value Ref Range    POC Glucose 230 (H) 75 - 110 mg/dL       Imaging/Diagnostics:  Xr Chest Portable    Result Date: 11/16/2020  Suggesting mild pulmonary vascular congestion. Otherwise, unremarkable single upright portable AP view of the chest.     Ct Chest Pulmonary Embolism W Contrast    Result Date: 11/16/2020  Limited evaluation for pulmonary embolus due to motion. No gross evidence of large central pulmonary embolus. Consolidation within the left lower lobe may represent pneumonia with malignancy not excluded. Close follow-up is recommended. Consider 3 months.        Assessment :      Hospital Problems           Last Modified POA    * (Principal) Acute on chronic systolic congestive heart failure ([address] 11/16/2020 Yes    Pneumonia due to infectious organism 11/16/2020 Yes    Acute hypoxemic respiratory failure ([address] 11/16/2020 Yes    Precordial pain 11/16/2020 Yes    GERD (gastroesophageal reflux disease) 11/16/2020 Yes    HTN (hypertension) 11/16/2020 Yes    DM (diabetes mellitus) ([address] 11/16/2020 Yes    Troponin level elevated 11/16/2020 Yes    Hypotension-possibly due to nitro drip 11/16/2020 Yes Plan:     Patient status inpatient in the Progressive Unit/Step down    1. BiPAP support as needed  2. IV Lasix 40 mg 2 times daily  3. Continue nitro drip, titrate to effect  4. Midodrine 5 mg 3 times a day to support blood pressure  5. Start IV Levaquin for pneumonia  6. DVT prophylaxis  7. Resume home other home medicines as before  8. Consult cardiology  9. Monitor vitals and labs    Consultations:   IP CONSULT TO HOSPITALIST  IP CONSULT TO HEART FAILURE NURSE/COORDINATOR  IP CONSULT TO DIETITIAN  IP CONSULT TO CARDIOLOGY  IP CONSULT TO SOCIAL WORK     Patient is admitted as inpatient status because of co-morbidities listed above, severity of signs and symptoms as outlined, requirement for current medical therapies and most importantly because of direct risk to patient if care not provided in a hospital setting. Expected length of stay > 48 hours.     Lauren Ramirez MD  11/16/2020  12:40 PM    Copy sent to Dr. Praveen Cerna MD (Inactive)

## 2020-11-16 NOTE — H&P
Critical Care - History and Physical Examination    Patient's name:  Eloy Knowles,2Nd  Floor Record Number: [id number]  Patient's account/billing number: [de-identified]  Patient's YOB: 1940  Age: 68 y.o. Date of Admission: 11/16/2020  5:12 AM  Date of History and Physical Examination: 11/16/2020  Primary Care Physician: Letha Rodriguez MD (Inactive)  Attending Physician: Dr Zarina Mensah Status: Full Code    Chief complaint:   Chief Complaint   Patient presents with    Chest Pain     HISTORY OF PRESENT ILLNESS:    68-year-old male with past medical history significant for  · Hypertension on lisinopril 40, HCTZ 25,  · Diabetes mellitus on sitagliptin 100 mg, pioglitazone 15 mg, Metformin 1 g on Lasix 20 mg  · CHF on Lasix 20 mg twice daily  · COPD on home oxygen  · Hyperlipidemia on ezetimibe 10 mg and simvastatin 60 mg    Who was transferred from the [facility] team for worsening respiratory status. Initially admitted with worsening shortness of breath and left-sided chest pain. Patient experienced left-sided chest pain which awoke him from sleep this morning approximately 3 hours prior to arrival per EMS. patient was noted to be tachypneic in the 40s, oxygen saturation in the low 80s on room air. This improved to 94% on nonrebreather mask but patient SOB persist. Patient's initial blood pressure was 905 systolic. Initial twelve-lead EKG and squad showed bigeminy. Initial concern was for CHF exacerbation, patient was started on IV Lasix, he was given 60 mg of IV Lasix with no improvement. Was put on BIPAP but respiratory distress persist.  On significant lab, he was found to have WBC count 21k, lactic acid 3.1->2.0, glucose 220, troponin 36->38->70. ABG was done showed 7.24/60/19/26. Rapid Covid came back negative. Blood culture x2 drawn negative so far. CT PE done negative for pulmonary embolism, but did show left lower lobe consolidation concerning for pneumonia.   Echo was completed showed normal LV SF, EF of 53%, No valvular abnormality. Patient was later transferred to ICU for worsening respiratory status. In the ICU, due to worsening respiratory status and increased work of breathing, the decision was made to intubate the patient. Patient was intubated after giving 20 of etomidate and 100 of succinylcholine. Patient got sedated with propofol and as needed fentanyl. PAST MEDICAL HISTORY:         Diagnosis Date    Back pain     CHF (congestive heart failure) (HCC)     DM (diabetes mellitus) (HCC)     Erectile dysfunction     GERD (gastroesophageal reflux disease)     HTN (hypertension)     Hyperlipemia      PAST SURGICAL HISTORY:         Procedure Laterality Date    TONSILLECTOMY         ALLERGIES:      Allergies   Allergen Reactions    Aleve [Naproxen Sodium]     Pcn [Penicillins]          HOME MEDS: :      Prior to Admission medications    Medication Sig Start Date End Date Taking? Authorizing Provider   NEURONTIN 600 MG tablet TAKE ONE-HALF TABLET BY MOUTH ONCE DAILY 8/9/13   Cande Brannon MD   lisinopril (ZESTRIL) 40 MG tablet Take 1 tablet by mouth daily. 12/31/12   Cande Brannon MD   omeprazole (PRILOSEC) 20 MG capsule Take 1 capsule by mouth daily. 12/11/12   Cande Brannon MD   furosemide (LASIX) 20 MG tablet Take 1 tablet by mouth 2 times daily. 11/14/12   Cande Brannon MD   ezetimibe (ZETIA) 10 MG tablet Take 1 tablet by mouth daily. 11/14/12   Cande Brannon MD   sitaGLIPtan (JANUVIA) 100 MG tablet Take 1 tablet by mouth daily. 8/9/12   Daryl Navarro MD   PROAIR  (90 BASE) MCG/ACT inhaler INHALE TWO PUFFS BY MOUTH EVERY 4 TO 6 HOURS AS NEEDED FOR SHORTNESS OF BREATH 4/9/12   Winda Snellen, MD   simvastatin (ZOCOR) 10 MG tablet Take 6 pills of zocor nightly 3/19/12   Winda Snellen, MD   sitagliptan (JANUVIA) 100 MG tablet Take 1 tablet by mouth daily. 1/20/12   Winda Snellen, MD   hydrochlorothiazide (HYDRODIURIL) 25 MG tablet Take 1 tablet by mouth daily.  1/20/12 Rob Laguerre MD   aspirin 81 MG EC tablet Take 1 tablet by mouth daily. 12   Rob Laguerre MD   ibuprofen (ADVIL;MOTRIN) 800 MG tablet Take 1 tablet by mouth every 6 hours as needed. 12   Rob Laguerre MD   diphenhydrAMINE (ALLERGY RELIEF) 25 MG capsule Take 25 mg by mouth every 6 hours as needed. Historical Provider, MD   hydrocortisone 1 % cream Apply  topically 2 times daily. Apply topically 2 times daily. Historical Provider, MD   Clotrimazole-Betamethasone (LOTRISONE EX) Apply  topically. Historical Provider, MD   lisinopril (ZESTRIL) 40 MG tablet Take 1 tablet by mouth daily for 360 days. 11  Tristen Du,    Insulin Pen Needle ([employer] PEN NEEDLES 31G) 31G X 8 MM MISC by Does not apply route. 11   Annalee Swanson MD   metformin (GLUCOPHAGE) 1000 MG tablet Take 1,000 mg by mouth 2 times daily (with meals). Historical Provider, MD   pioglitazone (ACTOS) 15 MG tablet Take 15 mg by mouth daily. Historical Provider, MD   clotrimazole (LOTRIMIN) 1 % cream Apply  topically 2 times daily. Apply topically 2 times daily. Historical Provider, MD       SOCIAL HISTORY:       TOBACCO:   reports that he has never smoked. He does not have any smokeless tobacco history on file. ETOH:   reports no history of alcohol use. DRUGS:  reports no history of drug use.      FAMILY HISTORY:          Problem Relation Age of Onset    Heart Disease Mother     Cancer Father      REVIEW OF SYSTEMS (ROS):   Unable to obtain due to patient condition    OBJECTIVE:     VITAL SIGNS:  BP (!) 100/56   Pulse 104   Temp 102.4 °F (39.1 °C)   Resp (!) 31   Ht 6' 2\" (1.88 m)   Wt 242 lb 15.2 oz (110.2 kg)   SpO2 99%   BMI 31.19 kg/m²   Tmax over 24 hours:  Temp (24hrs), Av.7 °F (38.2 °C), Min:98.9 °F (37.2 °C), Max:102.4 °F (39.1 °C)      Patient Vitals for the past 8 hrs:   BP Temp Pulse Resp SpO2 Height Weight   20 1442 -- -- -- (!) 31 -- 6' 2\" (1.88 m) 242 lb 15.2 oz (110.2 kg)   11/16/20 1400 -- 102.4 °F (39.1 °C) 104 28 99 % -- --   11/16/20 1315 (!) 100/56 -- 106 28 98 % -- --   11/16/20 1300 113/72 -- 113 27 90 % -- --   11/16/20 1245 (!) 102/58 -- 103 29 94 % -- --   11/16/20 1230 -- -- 104 28 96 % -- --   11/16/20 1224 -- -- -- 30 97 % -- --   11/16/20 1221 -- -- -- 26 -- -- --   11/16/20 1215 -- -- 103 28 96 % -- --   11/16/20 1200 (!) 94/51 -- 99 29 94 % -- --   11/16/20 1145 (!) 94/54 -- 101 28 94 % -- --   11/16/20 1130 94/68 -- 106 23 96 % -- --   11/16/20 1115 (!) 86/59 -- 107 26 94 % -- --   11/16/20 1100 (!) 91/51 -- 107 (!) 33 91 % -- --   11/16/20 1045 (!) 104/49 -- 104 (!) 31 91 % -- --   11/16/20 1039 (!) 94/58 -- -- -- -- -- --   11/16/20 1030 (!) 47/31 -- 111 27 93 % -- --   11/16/20 1015 (!) 82/58 -- 112 (!) 32 93 % -- --   11/16/20 1000 (!) 87/50 -- 110 28 94 % -- --   11/16/20 0945 (!) 88/52 -- 110 29 96 % -- --   11/16/20 0930 (!) 96/50 -- 110 (!) 33 95 % -- 244 lb 11.4 oz (111 kg)   11/16/20 0845 132/68 -- 112 27 95 % -- --   11/16/20 0830 116/73 -- 112 (!) 35 95 % -- --   11/16/20 0815 (!) 127/59 -- 117 29 94 % -- --   11/16/20 0807 (!) 114/57 -- 116 28 94 % -- --         Intake/Output Summary (Last 24 hours) at 11/16/2020 1557  Last data filed at 11/16/2020 1442  Gross per 24 hour   Intake --   Output 30 ml   Net -30 ml     Date 11/16/20 0000 - 11/16/20 2359   Shift 4233-5432 0691-7471 0755-6886 24 Hour Total   INTAKE   Shift Total(mL/kg)       OUTPUT   Urine(mL/kg/hr)  30  30   Shift Total(mL/kg)  30(0.3)  30(0.3)   Weight (kg) 117.9 110.2 110.2 110.2     Wt Readings from Last 3 Encounters:   11/16/20 242 lb 15.2 oz (110.2 kg)   01/20/12 257 lb 9.6 oz (116.8 kg)   11/29/11 260 lb (117.9 kg)     Body mass index is 31.19 kg/m². PHYSICAL EXAM:  Constitutional: Appear in distress, tachypneic and tachycardia on BiPAP. EENT: neck supple with midline trachea.   Neck: Supple, symmetrical, trachea midline, no adenopathy,  no jvd, skin normal  Respiratory: Increased work of breathing, decreased bilateral basilar respiratory sounds. No crackles or wheezes appreciated. Cardiovascular: regular rate and rhythm, normal S1, S2, no murmur noted  Abdomen: soft, nontender, nondistended, no masses or organomegaly  Extremities: Bilateral lower extremity 3+ edema with chronic skin changes on the shins.     MEDICATIONS:  Scheduled Meds:   aspirin  81 mg Oral Daily    ezetimibe  10 mg Oral Daily    gabapentin  300 mg Oral Daily    atorvastatin  10 mg Oral Daily    sodium chloride flush  10 mL Intravenous 2 times per day    [START ON 11/17/2020] carvedilol  3.125 mg Oral BID WC    enoxaparin  30 mg Subcutaneous BID    insulin lispro  0-12 Units Subcutaneous TID WC    insulin lispro  0-6 Units Subcutaneous Nightly    norepinephrine-sodium chloride        chlorhexidine  15 mL Mouth/Throat BID    famotidine (PEPCID) injection  20 mg Intravenous BID    cefTRIAXone (ROCEPHIN) IV  1 g Intravenous Q24H    azithromycin  500 mg Intravenous Q24H     Continuous Infusions:   dextrose      norepinephrine      propofol 30 mcg/kg/min (11/16/20 1554)     PRN Meds:   albuterol, 2.5 mg, Q4H PRN  sodium chloride flush, 10 mL, PRN  acetaminophen, 650 mg, Q6H PRN    Or  acetaminophen, 650 mg, Q6H PRN  polyethylene glycol, 17 g, Daily PRN  promethazine, 12.5 mg, Q6H PRN    Or  ondansetron, 4 mg, Q6H PRN  nicotine, 1 patch, Daily PRN  glucose, 15 g, PRN  dextrose, 12.5 g, PRN  glucagon (rDNA), 1 mg, PRN  dextrose, 100 mL/hr, PRN  perflutren lipid microspheres, 1.5 mL, ONCE PRN  ipratropium-albuterol, 1 ampule, Q4H PRN  fentanNYL, 50 mcg, Q2H PRN          ABGs:   Lab Results   Component Value Date    FIO2 NOT REPORTED 11/16/2020     DATA:  Complete Blood Count:   Recent Labs     11/16/20 0520   WBC 21.5*   RBC 4.76   HGB 14.0   HCT 44.8   MCV 94.1   MCH 29.4   MCHC 31.3   RDW 12.8      MPV 10.6      Last 3 Blood Glucose:   Recent Labs     11/16/20 0520 GLUCOSE 220*      PT/INR:    Lab Results   Component Value Date    PROTIME 10.1 11/16/2020    INR 1.0 11/16/2020     PTT:    Lab Results   Component Value Date    APTT 19.8 11/16/2020     Comprehensive Metabolic Profile:   Recent Labs     11/16/20  0518 11/16/20  0520   NA  --  136   K  --  4.6   CL  --  105   CO2  --  20   BUN  --  22   CREATININE CANNOT BE CALCULATED 1.08   GLUCOSE  --  220*   CALCIUM  --  9.1     Ionized Calcium:   Lab Results   Component Value Date    CAION 5.10 01/13/2012        Urinalysis:   Lab Results   Component Value Date    NITRU NEGATIVE 11/16/2020    COLORU YELLOW 11/16/2020    PHUR 5.0 11/16/2020    WBCUA None 11/16/2020    RBCUA 0 TO 2 11/16/2020    MUCUS NOT REPORTED 11/16/2020    TRICHOMONAS NOT REPORTED 11/16/2020    YEAST NOT REPORTED 11/16/2020    BACTERIA NOT REPORTED 11/16/2020    SPECGRAV 1.028 11/16/2020    LEUKOCYTESUR NEGATIVE 11/16/2020    UROBILINOGEN Normal 11/16/2020    BILIRUBINUR NEGATIVE 11/16/2020    GLUCOSEU NEGATIVE 11/16/2020    KETUA NEGATIVE 11/16/2020    AMORPHOUS NOT REPORTED 11/16/2020       HgBA1c:    Lab Results   Component Value Date    LABA1C 8.1 12/20/2011     TSH:    Lab Results   Component Value Date    TSH 0.85 11/16/2020     Radiological imaging  Xr Chest Portable    Result Date: 11/16/2020  EXAMINATION: ONE XRAY VIEW OF THE CHEST 11/16/2020 6:00 am COMPARISON: None. HISTORY: ORDERING SYSTEM PROVIDED HISTORY: SOB TECHNOLOGIST PROVIDED HISTORY: SOB FINDINGS: The heart is normal in size and configuration. The mediastinal contours are within normal limits. The lungs are well aerated. Pulmonary vasculature is mildly diffusely prominent. The pleural surfaces are normal and no evidence of a pleural effusion is seen. Bones and soft tissues are unremarkable. Suggesting mild pulmonary vascular congestion.  Otherwise, unremarkable single upright portable AP view of the chest.     Ct Chest Pulmonary Embolism W Contrast    Result Date: 11/16/2020  EXAMINATION: CTA OF THE CHEST 11/16/2020 8:53 am TECHNIQUE: CTA of the chest was performed after the administration of intravenous contrast.  Multiplanar reformatted images are provided for review. MIP images are provided for review. Dose modulation, iterative reconstruction, and/or weight based adjustment of the mA/kV was utilized to reduce the radiation dose to as low as reasonably achievable. COMPARISON: Chest radiograph November 16, 2020. HISTORY: ORDERING SYSTEM PROVIDED HISTORY: elevated dimer TECHNOLOGIST PROVIDED HISTORY: elevated dimer Reason for Exam: elevated dimer FINDINGS: Chest wall: No axillary adenopathy. Upper Abdomen: No adrenal nodule. Small-moderate hiatal hernia. Mediastinum: No cardiomegaly. No pericardial effusion. No adenopathy. Pulmonary Arteries: Evaluation is limited by motion. No gross evidence of central pulmonary embolus. More distal branches are not evaluated. Lungs: No pleural effusions. Consolidation within the medial aspect of the left lower lobe. Emphysematous changes. 7 mm nodular opacity within the right middle lobe series 4, image 91. Bones: Mild thoracic spine degenerative changes. Limited evaluation for pulmonary embolus due to motion. No gross evidence of large central pulmonary embolus. Consolidation within the left lower lobe may represent pneumonia with malignancy not excluded. Close follow-up is recommended. Consider 3 months. ASSESSMENT:     Principal Problem:    Acute on chronic systolic congestive heart failure (HCC)  Active Problems:    GERD (gastroesophageal reflux disease)    HTN (hypertension)    DM (diabetes mellitus) ([facility] [address]    Pneumonia due to infectious organism    Acute hypoxemic respiratory failure (HCC)    Troponin level elevated    Hypotension-possibly due to nitro drip    Precordial pain  Resolved Problems:    * No resolved hospital problems. *    PLAN:   1.  Acute on chronic hypoxic and hypercapnic respiratory failure due to #2 & #3. S/p intubated on 11/16. On PRVC mode, sedated with propofol and fentanyl. 2. Septic shock from left lower lobe pneumonia. Will start patient on IV ceftriaxone and azithromycin. Continue IV fluid 100 mL/h. Continue Levophed, wean as tolerated. follow-up on blood/urine and sputum culture. 3. Acute COPD exacerbation likely due to #2. S/p intubated on 11/16. Sedated with propofol and as needed fentanyl. Continue IV antibiotic, continue as needed nebulization and RT aerosol protocol. 4. Elevated troponin likely type II MI. Check troponin x2 every 8 H. Cardiology following. Cardiac cath per cardiology once patient is stable. 5. Diabetes mellitus type 2. Uncontrolled. Continue high-dose ISS, will start the trickle tube feed tonight. 6. Lower extremity edema. Will get lower extremity Doppler to rule out DVT. 7. Lactic Acidosis due to #1. Continue IV fluid. Check lactic acid every 8 hours x2.  8. Elevated D-dimer. Covid negative. CT PE negative for PE      DVT prophylaxis with Lovenox 30 mg twice daily  GI prophylaxis with Pepcid  Tube feed ordered. Diabetic        Michelle Ortega MD.  Internal Medicine Resident PGY [address] [place] [address]   11/16/2020, 3:57 PM    Attending Physician Statement  I have discussed the care of Caden Card, including pertinent history and exam findings,  with the resident. I have seen and examined the patient and the key elements of all parts of the encounter have been performed by me. I agree with the assessment, plan and orders as documented by the resident with additions .     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 Treatment plan Discussed with nursing staff in detail , all questions answered . Electronically signed by Elysia Tillman MD on   11/16/20 at 6:05 PM EST    Please note that this chart was generated using voice recognition Dragon dictation software. Although every effort was made to ensure the accuracy of this automated transcription, some errors in transcription may have occurred.

## 2020-11-16 NOTE — ED NOTES
COVID swab and Flu swab collected, labeled and sent to lab via tube system.         Willam Rodríguez RN  11/16/20 0288

## 2020-11-16 NOTE — PROGRESS NOTES
Pt is coughing with water. Pt won't remain lying in bed. Pt states it hurts his back too much. Pt is sitting on edge of bed with arms on table. Pt is not always cooperative.

## 2020-11-16 NOTE — ED NOTES
2 sets of blood cultures obtained, labeled and sent to lab via tube system.         Ehsan Foster RN  11/16/20 0905

## 2020-11-16 NOTE — ED NOTES
2nd troponin collected, labeled and sent to lab via tube system.         Chalino De León RN  11/16/20 4756

## 2020-11-16 NOTE — ED NOTES
Per Dr. Anai Taylor, this RN, and RRT  - plan to hold off on CT PE until pt less tachypneic and more stable. Lying pt flat at this time deemed unsafe per staff.        Francisco Crabtree RN  11/16/20 1477

## 2020-11-16 NOTE — ED PROVIDER NOTES
[facility]  FACULTY HANDOFF       Handoff taken on the following patient from prior Attending Physician:  Pt Name: Brandon Laws  PCP:  Daniela Castellon MD (Inactive)    Attestation  I was available and discussed any additional care issues that arose and coordinated the management plans with the resident(s) caring for the patient during my duty period. Any areas of disagreement with resident's documentation of care or procedures are noted on the chart. I was personally present for the key portions of any/all procedures during my duty period. I have documented in the chart those procedures where I was not present during the key portions. CHIEF COMPLAINT       Chief Complaint   Patient presents with    Chest Pain         CURRENT MEDICATIONS     Previous Medications  Previous Medications    ASPIRIN 81 MG EC TABLET    Take 1 tablet by mouth daily. CLOTRIMAZOLE (LOTRIMIN) 1 % CREAM    Apply  topically 2 times daily. Apply topically 2 times daily. CLOTRIMAZOLE-BETAMETHASONE (LOTRISONE EX)    Apply  topically. DIPHENHYDRAMINE (ALLERGY RELIEF) 25 MG CAPSULE    Take 25 mg by mouth every 6 hours as needed. EZETIMIBE (ZETIA) 10 MG TABLET    Take 1 tablet by mouth daily. FUROSEMIDE (LASIX) 20 MG TABLET    Take 1 tablet by mouth 2 times daily. HYDROCHLOROTHIAZIDE (HYDRODIURIL) 25 MG TABLET    Take 1 tablet by mouth daily. HYDROCORTISONE 1 % CREAM    Apply  topically 2 times daily. Apply topically 2 times daily. IBUPROFEN (ADVIL;MOTRIN) 800 MG TABLET    Take 1 tablet by mouth every 6 hours as needed. INSULIN PEN NEEDLE ([employer] PEN NEEDLES 31G) 31G X 8 MM MISC    by Does not apply route. LISINOPRIL (ZESTRIL) 40 MG TABLET    Take 1 tablet by mouth daily for 360 days. LISINOPRIL (ZESTRIL) 40 MG TABLET    Take 1 tablet by mouth daily. METFORMIN (GLUCOPHAGE) 1000 MG TABLET    Take 1,000 mg by mouth 2 times daily (with meals).       NEURONTIN 600 MG TABLET TAKE ONE-HALF TABLET BY MOUTH ONCE DAILY    OMEPRAZOLE (PRILOSEC) 20 MG CAPSULE    Take 1 capsule by mouth daily. PIOGLITAZONE (ACTOS) 15 MG TABLET    Take 15 mg by mouth daily. PROAIR  (90 BASE) MCG/ACT INHALER    INHALE TWO PUFFS BY MOUTH EVERY 4 TO 6 HOURS AS NEEDED FOR SHORTNESS OF BREATH    SIMVASTATIN (ZOCOR) 10 MG TABLET    Take 6 pills of zocor nightly    SITAGLIPTAN (JANUVIA) 100 MG TABLET    Take 1 tablet by mouth daily. SITAGLIPTAN (JANUVIA) 100 MG TABLET    Take 1 tablet by mouth daily. Encounter Medications  Orders Placed This Encounter   Medications    hydrOXYzine (VISTARIL) injection 50 mg    nitroGLYCERIN 50 mg in dextrose 5% 250 mL infusion    enoxaparin (LOVENOX) injection 120 mg       ALLERGIES     is allergic to aleve [naproxen sodium] and pcn [penicillins]. RECENT VITALS:   Temp: 98.9 °F (37.2 °C),  Pulse: 117, Resp: 30, BP: 122/66    RADIOLOGY:   XR CHEST PORTABLE   Final Result   Suggesting mild pulmonary vascular congestion.       Otherwise, unremarkable single upright portable AP view of the chest.             LABS:  Labs Reviewed   CBC WITH AUTO DIFFERENTIAL - Abnormal; Notable for the following components:       Result Value    WBC 21.5 (*)     Seg Neutrophils 91 (*)     Lymphocytes 4 (*)     Segs Absolute 19.44 (*)     Absolute Lymph # 0.75 (*)     All other components within normal limits   BASIC METABOLIC PANEL - Abnormal; Notable for the following components:    Glucose 220 (*)     All other components within normal limits   LACTATE, SEPSIS - Abnormal; Notable for the following components:    Lactic Acid, Sepsis, Whole Blood 3.1 (*)     All other components within normal limits   TROPONIN - Abnormal; Notable for the following components:    Troponin, High Sensitivity 36 (*)     All other components within normal limits   APTT - Abnormal; Notable for the following components:    PTT 19.8 (*)     All other components within normal limits   TROPONIN - Abnormal; Notable for the following components:    Troponin, High Sensitivity 38 (*)     All other components within normal limits   RAPID INFLUENZA A/B ANTIGENS   CULTURE, BLOOD 1   CULTURE, BLOOD 1   COVID-19   BRAIN NATRIURETIC PEPTIDE   PROTIME-INR   D-DIMER, QUANTITATIVE   LACTATE, SEPSIS   URINE RT REFLEX TO CULTURE   POC BLOOD GAS AND CHEMISTRY       In severe shortness of breath, hypertensive, hypoxemic. History of CHF, on home oxygen 2 L per nasal cannula all day long, patient admits to aerosols, not check admitted and his past medical history. Patient has mild improvement with BiPAP and nitroglycerin. Still unable to lie supine due to. Dyspnea. On exam he remains tachypneic, expiratory prolongation, diminished sounds, no obvious gallop. Right leg is wrapped with a dressing. PLAN/ TASKS OUTSTANDING     We will give trial of aerosols, Lasix, reconsider CTA chest if able. Awaiting admission. (Please note that portions of this note were completed with a voice recognition program.  Efforts were made to edit the dictations but occasionally words are mis-transcribed. )    [place] MD, F.A.C.E.P.   Attending Emergency Physician       Marco Villalobos MD  11/16/20 3010

## 2020-11-16 NOTE — ED NOTES
Pt arrives to ED via [facility] for c/o chest pain and difficultly breathing. EMS reports pt called out for sudden onset of CP approximately 2 hours PTA to ED. Pt has hx of CHF and wears home oxygen. Per EMS report patient was initially 84% oxygen saturation, increased to 94% on NRB. Pt arrives in severe respiratory distress, breathing at about 50 respirations per minute, unable to speak. Pt nods appropriately to answer questions. Pt denies known sick contacts, denies feeling ill previously until onset of symptoms that awoke him from sleep. Pt is 76% on room air, RRT at bedside, Dr. Jeni Bonner at bedside.        Stephanie Barbosa RN  11/16/20 9782

## 2020-11-17 PROBLEM — R65.21 SEPTIC SHOCK (HCC): Status: ACTIVE | Noted: 2020-01-01

## 2020-11-17 PROBLEM — A41.9 SEPTIC SHOCK (HCC): Status: ACTIVE | Noted: 2020-01-01

## 2020-11-17 NOTE — PROGRESS NOTES
Physical Therapy  DATE: 2020    NAME: Marella Lundborg  MRN: [id number]   : 1940    Patient not seen this date for Physical Therapy due to:  [] Blood transfusion in progress  [] Hemodialysis  [] Patient Declined  [] Spine Precautions   [] Strict Bedrest  [] Surgery/ Procedure  [] Testing      [x] Other: pt intubated, not medically appropriate at this time. PT will check back for evaluation appropriate 20. [] PT is being discontinued at this time. Patient independent. No further needs. [] PT is being discontinued at this time due to declining physical/ medical status. Therapy is not appropriate at this time.     Rashaad Kauffman, PT

## 2020-11-17 NOTE — PROCEDURES
PROCEDURE NOTE - CENTRAL VENOUS LINE PLACEMENT    PATIENT NAME: Tania Chung  MEDICAL RECORD NO. [id number]  DATE: 11/16/2020  ATTENDING PHYSICIAN: Dr. Hannah Olivares DIAGNOSIS:  vascular access and hypovolemia  POSTOPERATIVE DIAGNOSIS:  Same  PROCEDURE PERFORMED:  Right Internal Jugular Vein Central Line Insertion  PERFORMING PHYSICIAN: Kera Mae DO  ANESTHESIA:  Local utilizing 1% lidocaine  ESTIMATED BLOOD LOSS:  Less than 25 ml  COMPLICATIONS:  None immediately appreciated. DISCUSSION:  Tania Chung is a 68y.o.-year-old male who requires central IV access vascular access, hypovolemia, central venous monitoring and centrally administered medications. The history and physical examination were reviewed and confirmed. CONSENT: The patient's son provided verbal consent for this procedure. PROCEDURE:  A timeout was initiated by the bedside nurse and was confirmed by those present. The patient was placed in a supine position. The skin overlying the Right Internal Jugular Vein was prepped with chlorhexadine and draped in sterile fashion. The skin was infiltrated with local anesthetic. The vessel and surrounding anatomy was visualized using ultrasound. Through the anesthetized region, the introducer needle was inserted into the internal jugular vein returning dark red non pulsatile blood. A guidewire was placed through the center of the needle with no resistance. Ultrasound confirmed presence of wire in the vein. A small incision made in the skin with a #11 scalpel blade. The dilator was inserted into the skin and vein over guidewire using Seldinger technique. The dilator was then removed and the 7F 20cm catheter was placed in the vein over the guidewire using Seldinger technique. The guidewire was then removed and all ports aspirated and flushed appropriately. The catheter then secured using silk suture and a temporary sterile dressing was applied.   No immediate complication was

## 2020-11-17 NOTE — PROGRESS NOTES
[facility]   Progress Note                    Date:   11/17/2020  Patient name:  Branden Rojas  Date of admission:  11/16/2020  5:12 AM  MRN:   [id number]  YOB: 1940  PCP:    Bc Vences MD (Inactive)    Reason for Admission:  Heart failure [facility]) [I50.9]  Acute on chronic systolic congestive heart failure ([facility] [address] [I50.23]    Subjective:     Clinical Changes / Abnormalities:    Patient seen and examined at bedside. Overnight had to be intubated and sedated using versed. Per primary team, patients condition continued to worsen and decision was made to intubate patient. Currently maxed out on levophed, vasopressin, gemma & phenylephrine. Broad spectrum antibiotics being continued by primary team for pneumonia.     Urine output in the last 24 hours:     Intake/Output Summary (Last 24 hours) at 11/17/2020 1024  Last data filed at 11/17/2020 0800  Gross per 24 hour   Intake 7425.87 ml   Output 188 ml   Net 7237.87 ml     I/O since admission: +7.2 liters            Medications:   Scheduled Meds:   cefTRIAXone (ROCEPHIN) IV  2 g Intravenous Q24H    albuterol  2.5 mg Nebulization BID    hydrocortisone sodium succinate PF  100 mg Intravenous Q8H    heparin (porcine)  5,000 Units Subcutaneous 3 times per day    aspirin  81 mg Oral Daily    ezetimibe  10 mg Oral Daily    gabapentin  300 mg Oral Daily    atorvastatin  10 mg Oral Daily    sodium chloride flush  10 mL Intravenous 2 times per day    azithromycin  500 mg Intravenous Q24H    ipratropium-albuterol  1 ampule Inhalation 4x daily    vancomycin  1,250 mg Intravenous Q12H    vancomycin (VANCOCIN) intermittent dosing (placeholder)   Other RX Placeholder     Continuous Infusions:   insulin 34.32 Units/hr (11/17/20 1000)    dextrose      IV infusion builder      vasopressin (Septic Shock) infusion      phenylephrine (GEMMA-SYNEPHRINE) 50mg/250mL infusion      EPINEPHrine infusion      norepinephrine 60 mcg/min (11/17/20 1019)  midazolam Stopped (11/17/20 0201)     CBC:   Recent Labs     11/16/20  0520 11/16/20  2157 11/17/20  0220 11/17/20  0611   WBC 21.5*  --  37.4* 38.8*   HGB 14.0 13.3 14.2 13.7     --  313 305     BMP:    Recent Labs     11/17/20  0430 11/17/20  0611 11/17/20  0803   * 134* 133*   K 5.9* 5.4* 4.9    102 103   CO2 17* 17* 15*   BUN 34* 35* 34*   CREATININE 2.35* 2.64* 2.68*   GLUCOSE 480* 479* 447*     Hepatic: No results for input(s): AST, ALT, ALB, BILITOT, ALKPHOS in the last 72 hours. Troponin: No results for input(s): TROPONINI in the last 72 hours. Recent Labs     11/16/20 2009 11/17/20  0005 11/17/20  0803   TROPONINT NOT REPORTED NOT REPORTED NOT REPORTED     BNP:   Recent Labs     11/16/20  0520 11/17/20  0611   PROBNP 139 7,908*      No results for input(s): BNP in the last 72 hours. Lipids:   Recent Labs     11/17/20 0611   CHOL 64   HDL 30*     INR:   Recent Labs     11/16/20 0520   INR 1.0       Objective:   Vitals: BP (!) (P) 73/41   Pulse 93   Temp (P) 97 °F (36.1 °C) (Axillary)   Resp 28   Ht 6' 2\" (1.88 m)   Wt 242 lb 15.2 oz (110.2 kg)   SpO2 (!) 87%   BMI 31.19 kg/m²      Constitutional and General Appearance:    Intubated and sedated  Respiratory:  · No for increased work of breathing. · On auscultation: diminished breath sounds  Cardiovascular:  · The apical impulse is not displaced  · Heart tones are crisp and normal. Regular S1 and S2. No murmurs. Abdomen:   · No masses or tenderness  · Bowel sounds present  Extremities:  ·  No Cyanosis or Clubbing  ·  Lower extremity edema: No  ·  Skin: Warm and dry  Neurological:  · Intubated and sedated    Diagnostic Studies:     ECHO: 11/16/2020  Summary  Left ventricle is normal in size. Global left ventricular systolic function  is difficult to assess due to heart rate but appears normal. Calculated  ejection fraction 53% by Heart Model. Dilated right ventricular cavity. Normal right ventricular function.   No significant valvular regurgitation or stenosis seen. Left pleural effusion. IVC Increased diameter, but still has inspiratory variation suggesting upper  normal or mildly elevated RA filling pressure (i.e. CVP) . Assessment / Acute Cardiac Problems:   1. Acute hypoxic respiratory failure likely secondary to pneumonia  2. Septic Shock likely secondary to pneumonia  3. Troponin elevation likely type II demand ischemia is setting of hypoxia, tachycardia and sepsis  4. HTN  5. T2DM  6. HLD  7. Pneumonia    Plan of Treatment:   1. Currently on maxed doses of vasopressin, levophed, angela & phenylephrine. Wean as tolerated  2. Intubated and sedated. Wean as tolerated  3. Continue ASA & statin  4. Hold off on BB or any other anti-hypertensive agents until shock resolves  5. Continue to trend troponin  6. Once acute issues resolve will consider coronary angiography for ischemic work up  7. K>4, Mg>2  8. Rest per primary    Renu Tubbs MD  Fellow, [address] [place] [address]      Attending Cardiologist Addendum: I have reviewed and performed the history, physical, subjective, objective, assessment, and plan with the resident/fellow and agree with the note. I performed the history and physical personally. I have made changes to the note above as needed. Severe Sepsis/Septic Shock- maxed on 4 pressors  Worsening OZ  CVVHD is planned  Preserved LVEF on Echo  Consideration for ischemia workup when improved  D/w Dr. Saravanan Negron at bedside, will try albumin   Prognosis guarded, extremely critical     Thank you for allowing me to participate in the care of this patient, please do not hesitate to call if you have any questions. Ling Solorio DO, [facility], [address], [address] [facility]  [email or web address]  [phone]

## 2020-11-17 NOTE — CONSULTS
infusion, Continuous  glucose (GLUTOSE) 40 % oral gel 15 g, PRN  dextrose 50 % IV solution, PRN  glucagon (rDNA) injection 1 mg, PRN  dextrose 5 % solution, PRN  cefTRIAXone (ROCEPHIN) 2 g IVPB in D5W 50ml minibag, Q24H  albuterol (PROVENTIL) nebulizer solution 2.5 mg, BID  hydrocortisone sodium succinate PF (SOLU-CORTEF) injection 100 mg, Q8H  heparin (porcine) injection 5,000 Units, 3 times per day  sodium bicarbonate 150 mEq in sterile water 1,000 mL infusion, Continuous  vasopressin 20 Units in sodium chloride 0.9 % 100 mL infusion, Continuous  phenylephrine (GEMMA-SYNEPHRINE) 50 mg in sodium chloride 0.9 % 250 mL infusion, Continuous  EPINEPHrine (EPINEPHrine HCL) 5 mg in sodium chloride 0.9 % 250 mL infusion, Continuous  aspirin EC tablet 81 mg, Daily  ezetimibe (ZETIA) tablet 10 mg, Daily  gabapentin (NEURONTIN) tablet 300 mg, Daily  albuterol (PROVENTIL) nebulizer solution 2.5 mg, Q4H PRN  atorvastatin (LIPITOR) tablet 10 mg, Daily  sodium chloride flush 0.9 % injection 10 mL, 2 times per day  sodium chloride flush 0.9 % injection 10 mL, PRN  acetaminophen (TYLENOL) tablet 650 mg, Q6H PRN    Or  acetaminophen (TYLENOL) suppository 650 mg, Q6H PRN  polyethylene glycol (GLYCOLAX) packet 17 g, Daily PRN  promethazine (PHENERGAN) tablet 12.5 mg, Q6H PRN    Or  ondansetron (ZOFRAN) injection 4 mg, Q6H PRN  nicotine (NICODERM CQ) 21 MG/24HR 1 patch, Daily PRN  perflutren lipid microspheres (DEFINITY) injection 1.65 mg, ONCE PRN  norepinephrine (LEVOPHED) 16 mg in sodium chloride 0.9 % 250 mL infusion, Continuous  azithromycin (ZITHROMAX) 500 mg in dextrose 5% 250 mL IVPB, Q24H  ipratropium-albuterol (DUONEB) nebulizer solution 1 ampule, 4x daily  midazolam (VERSED) 1 mg/mL in D5W infusion, Continuous  vancomycin (VANCOCIN) 1250 mg in dextrose 5 % 250 mL IVPB, Q12H  vancomycin (VANCOCIN) intermittent dosing (placeholder), RX Placeholder        Allergies:  Aleve [naproxen sodium] and Pcn [penicillins]    Social History:   Social History     Socioeconomic History    Marital status: Single     Spouse name: Not on file    Number of children: Not on file    Years of education: Not on file    Highest education level: Not on file   Occupational History    Not on file   Social Needs    Financial resource strain: Not on file    Food insecurity     Worry: Not on file     Inability: Not on file    Transportation needs     Medical: Not on file     Non-medical: Not on file   Tobacco Use    Smoking status: Never Smoker   Substance and Sexual Activity    Alcohol use: No    Drug use: No    Sexual activity: Not on file   Lifestyle    Physical activity     Days per week: Not on file     Minutes per session: Not on file    Stress: Not on file   Relationships    Social connections     Talks on phone: Not on file     Gets together: Not on file     Attends Restorationist service: Not on file     Active member of club or organization: Not on file     Attends meetings of clubs or organizations: Not on file     Relationship status: Not on file    Intimate partner violence     Fear of current or ex partner: Not on file     Emotionally abused: Not on file     Physically abused: Not on file     Forced sexual activity: Not on file   Other Topics Concern    Not on file   Social History Narrative    Not on file       Family History:   Family History   Problem Relation Age of Onset    Heart Disease Mother     Cancer Father        Review of Systems:    Constitutional: Unable to get review of system patient is now intubated    Objective:  CURRENT TEMPERATURE:  Temp: (P) 97 °F (36.1 °C)  MAXIMUM TEMPERATURE OVER 24HRS:  Temp (24hrs), Av °F (37.2 °C), Min:97 °F (36.1 °C), Max:102.4 °F (39.1 °C)    CURRENT RESPIRATORY RATE:  Resp: 28  CURRENT PULSE:  Pulse: 93  CURRENT BLOOD PRESSURE:  BP: (!) (P) 73/41  24HR BLOOD PRESSURE RANGE:  Systolic (92WWR), DFT:282 , Min:47 , RRT:758   ; Diastolic (86VNU), UVM:80, Min:17, Max:98    24HR LEUKOCYTESUR NEGATIVE 11/16/2020    UROBILINOGEN Normal 11/16/2020    BILIRUBINUR NEGATIVE 11/16/2020    GLUCOSEU NEGATIVE 11/16/2020    [address] NEGATIVE 11/16/2020    AMORPHOUS NOT REPORTED 11/16/2020         Radiology:  Reviewed as available. Assessment:  1. Acute kidney injury due to sepsis, hypotension and hypoperfusion state resulting in ATN. Patient is oligoanuric. 2.  Septic shock patient is on 4 pressors and systolic blood pressure is barely in 70s  3. Respiratory failure on 100% FiO2 and oxygen saturation is 89%  4. Severe metabolic acidosis  5. Hyperkalemia due to renal failure and metabolic acidosis. Conservatively managed and last potassium is 4.9  6. Advanced COPD and oxygen dependent  7. Chronic active smoking    I had a long discussion with critical care team.  With current level of pressor requirement and hypotension CVVHD will be very difficult. Once patient is clinically better and his systolic blood pressure is around 43-28 systolic we can try CVVHD with priming the system with albumin. 2.  I had a long discussion with the patient's son Marta Cervantes who was present at the bedside. Condition explained. Whole process of CVVHD explained to him. He is agreeable that if needed he is agrees to CVVHD. We will consider renal replacement therapy once patient is hemodynamically stable. Plan:  1. Continue bicarbonate drip  2. Continue hemodynamic support  3. We will initiate CRRT once his systolic blood pressure is at least above 98-01 systolic  4. Continue to check electrolytes every 6 hours  5. Thank you very much and will follow with you    Thank you for the consultation. Please do not hesitate to call with questions.   This note is created with the assistance of a speech-recognition program. While intending to generate a document that actually reflects the content of the visit, no guarantees can be provided that every mistake has been identified and corrected by editing  Electronically signed by Ajith Scott MD on 11/17/2020 at 10:35 AM      Electronically signed by Ajith Scott MD on 11/17/2020 at 10:21 AM

## 2020-11-17 NOTE — PROGRESS NOTES
Patient was seen and examined. Patient is on 100% FiO2 and saturation only 86%. Hemodynamically relatively better but no changes in pressor requirement. Patient was made to start him on CVVHD. Orders were discussed with nursing staff. Will run 0 ultrafiltration. The aim is to correct acidosis at this point. CVVHD was started. Patient was not tolerating it. He dropped his blood pressure down into the 50s both cuff and arterial.  His O2 sat was barely 70 to 75% on 100% FiO2. Patient is not tolerating CVVHD and I made the decision to discontinue CVVHD.

## 2020-11-17 NOTE — PROGRESS NOTES
Critical Care Team - Daily Progress Note      Date and time: 11/17/2020 7:14 AM  Patient's name:  Eloy Knowles,2Nd  Floor Record Number: [id number]  Patient's account/billing number: [de-identified]  Patient's YOB: 1940  Age: 68 y.o. Date of Admission: 11/16/2020  5:12 AM  Length of stay during current admission: 1  Primary Care Physician: Severo Screen, MD (Inactive)  ICU Attending Physician: Dr. Sofya Davenport Status: Full Code    Reason for ICU admission:   Chief Complaint   Patient presents with    Chest Pain     Subjective:   OVERNIGHT EVENTS:   over the course of the night, patient has become significantly more ill is now maxed on 4 pressors. Recent lab work shows he has elevated creatinine, hyperkalemia, and blood gas shows that he is significantly acidotic. He has received several amps of bicarb as well as 2 doses of insulin as well as start on insulin drip. Despite this he still has refractory hyperkalemia and worsening renal function. on call resident discussed with Dr. Kareem Alexis from nephrology. He recommended that we can increase the bicarb drip to 150 mEq at 150 cc/h, but that there is little else to offer him at this point given how critically ill he is, and likely his impaired perfusion to his kidneys. WBC count worsened to 38.8 from 21.5 yesterday. ABG this morning showed 6.95/90/53/20. This morning patient is still on 4 pressor support with vaso 0.04, epi of 10, levo of 60 and new of 300. Blood pressure is still on the lower side 64/42with map of 45. Continue to be intubated on PRVC mode 30/630/16/100. Saturation has been 85% all night. Overnight patient IV is infiltrated in the left forearm. Vascular consulted, recommended warm compresses and signed off.     AWAKE & FOLLOWING COMMANDS:  [x] No   [] Yes    CURRENT VENTILATION STATUS:     [x] Ventilator  [] BIPAP  [] Nasal Cannula [] Room Air      SECRETIONS Amount:  [] Small [] Moderate  [] Large  [x] None  Color:     [] White [] Colored  [] Bloody    SEDATION:  RAAS Score:  [] Propofol gtt  [] Versed gtt  [] Ativan gtt   [x] No Sedation    PARALYZED:  [x] No    [] Yes    DIARRHEA:                [x] No                [] Yes  (C. Difficile status: [] positive                                                                                                                       [] negative                                                                                                                     [] pending)    VASOPRESSORS:  [] No    [x] Yes    If yes -   [x] Levophed       [] Dopamine     [x] Vasopressin       [] Dobutamine  [x] Phenylephrine         [x] Epinephrine    CENTRAL LINES:     [] No   [x] Yes   (Date of Insertion:   )           If yes -     [x] Right IJ     [] Left IJ [] Right Femoral [] Left Femoral                   [] Right Subclavian [] Left Subclavian       HENDERSON'S CATHETER:   [] No   [x] Yes  (Date of Insertion:   )     URINE OUTPUT:            [] Good   [x] Low              [] Anuric  OBJECTIVE:     VITAL SIGNS:  BP (!) 71/42   Pulse 95   Temp 99 °F (37.2 °C) (Oral)   Resp 30   Ht 6' 2\" (1.88 m)   Wt 242 lb 15.2 oz (110.2 kg)   SpO2 (!) 87%   BMI 31.19 kg/m²   Tmax over 24 hours:  Temp (24hrs), Av.4 °F (37.4 °C), Min:97.9 °F (36.6 °C), Max:102.4 °F (39.1 °C)      Patient Vitals for the past 8 hrs:   BP Temp Temp src Pulse Resp SpO2   20 0700 -- -- -- 95 30 (!) 87 %   20 0645 -- -- -- 96 30 (!) 87 %   20 0630 -- -- -- 96 30 (!) 87 %   20 0615 -- -- -- 96 30 (!) 87 %   20 0600 -- -- -- 96 30 (!) 88 %   20 0545 -- -- -- 97 29 (!) 87 %   20 0530 -- -- -- 93 30 90 %   20 0515 -- -- -- 92 30 (!) 89 %   20 0500 -- -- -- 94 29 90 %   20 0445 -- -- -- 93 30 (!) 89 %   20 0430 -- -- -- 94 30 (!) 89 %   20 0415 -- -- -- 95 30 (!) 88 %   20 0400 (!) 71/42 99 °F (37.2 °C) Oral 99 30 (!) 88 %   20 0345 -- -- -- 101 30 (!) 89 %   20 0330 -- -- -- 99 30 (!) 88 %   11/17/20 0328 -- -- -- 100 30 (!) 88 %   11/17/20 0315 -- -- -- 101 30 (!) 89 %   11/17/20 0300 -- -- -- 100 30 (!) 89 %   11/17/20 0249 -- 98.1 °F (36.7 °C) -- -- -- --   11/17/20 0245 -- -- -- 101 29 (!) 89 %   11/17/20 0230 -- -- -- 103 (!) 31 (!) 89 %   11/17/20 0215 -- -- -- 102 29 (!) 88 %   11/17/20 0200 -- -- -- 101 -- (!) 88 %   11/17/20 0145 -- -- -- 103 -- (!) 88 %   11/17/20 0130 98/74 -- -- 105 -- (!) 86 %   11/17/20 0115 -- -- -- 104 -- (!) 88 %   11/17/20 0100 (!) 134/98 97.9 °F (36.6 °C) Oral 105 (!) 35 (!) 89 %   11/17/20 0045 -- -- -- 106 -- (!) 89 %   11/17/20 0030 (!) 124/17 -- -- 101 -- (!) 88 %   11/17/20 0015 -- -- -- 102 -- (!) 89 %   11/17/20 0000 115/61 -- -- 120 -- 92 %   11/16/20 2346 -- -- -- 115 (!) 32 93 %   11/16/20 2345 -- -- -- 115 -- 91 %   11/16/20 2330 (!) 96/55 -- -- 115 -- 93 %   11/16/20 2315 -- -- -- 117 -- 93 %         Intake/Output Summary (Last 24 hours) at 11/17/2020 0714  Last data filed at 11/17/2020 0400  Gross per 24 hour   Intake 6509.87 ml   Output 183 ml   Net 6326.87 ml     Date 11/17/20 0000 - 11/17/20 2359   Shift 8991-5724 8332-6275 0332-4580 24 Hour Total   INTAKE   I.V.(mL/kg) 6245(24.3)   3788(84.9)   Shift Total(mL/kg) 5820(47.0)   7991(89.5)   OUTPUT   Urine(mL/kg/hr) 23   23   Shift Total(mL/kg) 23(0.2)   23(0.2)   Weight (kg) 110.2 110.2 110.2 110.2     Wt Readings from Last 3 Encounters:   11/16/20 242 lb 15.2 oz (110.2 kg)   01/20/12 257 lb 9.6 oz (116.8 kg)   11/29/11 260 lb (117.9 kg)     Body mass index is 31.19 kg/m². PHYSICAL EXAM:  Constitutional: Intubated. Not following commands  HEENT: PERRLA, EOMI, sclera clear, anicteric  Respiratory: Clear to auscultation on anterior chest.  Inspiratory crackles on left lower lung. Cardiovascular: regular rate and rhythm, normal S1, S2, no murmur noted and 2+ pulses throughout  Abdomen: soft, nontender, nondistended, no masses or organomegaly  NEUROLOGIC: intubated. Dr. Florie Dakins)  Rate Set: 30 bmp  FiO2 : 100 %  SpO2: (!) 87 %  SpO2/FiO2 ratio: 88  Sensitivity: 3  PEEP/CPAP: 16  I Time/ I Time %: 0.9 s  Humidification Source: HME  Mask Type: Full face mask  Mask Size: Medium  Additional Respiratory  Assessments  Pulse: 95  Resp: 30  SpO2: (!) 87 %  $End Tidal CO2: 29  Humidification Source: HME  Oral Care Completed?: Yes  Oral Care: Mouth swabbed, Mouth moisturizer, Mouth suctioned  Subglottic Suction Done?: No    ABGs:   Lab Results   Component Value Date    RST1IST 23 11/17/2020    FIO2 100.0 11/17/2020     DATA:  Complete Blood Count:   Recent Labs     11/16/20  0520 11/16/20  2157 11/17/20  0220 11/17/20  0611   WBC 21.5*  --  37.4* 38.8*   HGB 14.0 13.3 14.2 13.7   MCV 94.1  --  99.2 98.7     --  313 305   RBC 4.76  --  4.84 4.73   HCT 44.8 43.6 48.0 46.7   MCH 29.4  --  29.3 29.0   MCHC 31.3  --  29.6 29.3   RDW 12.8  --  12.6 12.4   MPV 10.6  --  10.9 11.4      PT/INR:    Lab Results   Component Value Date    PROTIME 10.1 11/16/2020    INR 1.0 11/16/2020     PTT:    Lab Results   Component Value Date    APTT 19.8 11/16/2020     Basal Metabolic Profile:   Recent Labs     11/17/20  0220 11/17/20  0430 11/17/20  0611   * 133* 134*   K 6.7* 5.9* 5.4*   BUN 33* 34* 35*   CREATININE 2.29* 2.35* 2.64*    103 102   CO2 15* 17* 17*      Magnesium:   Lab Results   Component Value Date    MG 1.6 11/17/2020    MG 1.8 11/17/2020    MG 1.1 11/17/2020     Phosphorus:   Lab Results   Component Value Date    PHOS 5.2 11/17/2020    PHOS 5.1 11/17/2020    PHOS 4.7 11/17/2020     S.  Calcium:  Recent Labs     11/17/20  0611   CALCIUM 7.6*     Urinalysis:   Lab Results   Component Value Date    NITRU NEGATIVE 11/16/2020    COLORU YELLOW 11/16/2020    PHUR 5.0 11/16/2020    WBCUA None 11/16/2020    RBCUA 0 TO 2 11/16/2020    MUCUS NOT REPORTED 11/16/2020    TRICHOMONAS NOT REPORTED 11/16/2020    YEAST NOT REPORTED 11/16/2020    BACTERIA NOT REPORTED 11/16/2020    SPECGRAV 1.028 11/16/2020    LEUKOCYTESUR NEGATIVE 11/16/2020    UROBILINOGEN Normal 11/16/2020    BILIRUBINUR NEGATIVE 11/16/2020    GLUCOSEU NEGATIVE 11/16/2020    KETUA NEGATIVE 11/16/2020    AMORPHOUS NOT REPORTED 11/16/2020     Last 3 Blood Glucose:   Recent Labs     11/16/20  0520 11/17/20  0058 11/17/20  0220 11/17/20  0430 11/17/20  0611   GLUCOSE 220* 427* 451* 480* 479*      HgBA1c:    Lab Results   Component Value Date    LABA1C 7.7 11/16/2020     TSH:    Lab Results   Component Value Date    TSH 0.85 11/16/2020     Cultures during this admission:     Blood cultures:                 [] None drawn      [x] Negative             []  Positive (Details:  )  Urine Culture:                   [x] None drawn      [] Negative             []  Positive (Details:  )  Sputum Culture:               [] None drawn       [] Negative             [x]  Positive (Details: gram positive cocci )   Endotracheal aspirate:     [x] None drawn       [] Negative             []  Positive (Details:  )    ASSESSMENT:     Principal Problem:    Acute on chronic systolic congestive heart failure (HCC)  Active Problems:    GERD (gastroesophageal reflux disease)    HTN (hypertension)    DM (diabetes mellitus) ([facility] [address]    Pneumonia due to infectious organism    Acute hypoxemic respiratory failure (HCC)    Troponin level elevated    Hypotension-possibly due to nitro drip    Precordial pain  Resolved Problems:    * No resolved hospital problems. *    PLAN:   1. Acute on chronic hypoxic and hypercapnic respiratory failure due to #2 & #3. S/p intubated on 11/16. Worsening. On PRVC mode 30/630/16/100, ABG 6.95/90/53/20. 2. Septic shock from left lower lobe pneumonia. Worsening. CXR showed worsening of LLL infiltrate. Continue ceftriaxone and azithromycin. Started on IV vancomycin. IV fluid changed to bicarb. Wean pressors as tolerated. Continue IV steroids. Follow-up on blood/urine and sputum culture. 3. COPD exacerbation likely due to #2.  S/p intubated on 11/16. Continue IV antibiotic, continue as needed nebulization and RT aerosol protocol. Continue IV steroids. 4. Elevated troponin likely type II MI. Up trending. Cardiology following. Cardiac cath per cardiology once patient is stable. 5. Suspect acute on chronic CHF. proBNP 7908 up trended from 139. Cardiology following. 6. OZ likely due to hypoperfusion from shock. Creatinine worsening. Nephrology consulted. Continue IV fluid. 7. Diabetes mellitus type 2. Uncontrolled. Started on IV insulin drip  8. Lactic Acidosis. Worsening. Continue IV fluid. Check lactic acid every 8.  9.  Hyperkalemia. Resolved. 10. Hypocalcemia and hypomagnesemia. Replace per protocol   11. Lower extremity edema. Follow-up on lower extremity Doppler to rule out DVT. 12. Elevated D-dimer. Covid negative. CT PE negative for PE        DVT prophylaxis with Subq heparin 5k tid   GI prophylaxis with Pepcid  Tube feed ordered. Diabetic  Meron Ceron MD.  Internal Medicine Resident PGY [address]   11/17/2020, 7:14 AM      Attending Physician Statement  I have discussed the care of Taryn Rhodes, including pertinent history and exam findings,  with the resident. I have seen and examined the patient and the key elements of all parts of the encounter have been performed by me. I agree with the assessment, plan and orders as documented by the resident with additions . Severe sepsis with shock , OZ , nstemi , encephalopathy - ARDS due to pneumococcal pneumonia left lower lobe .  Acute respiratory acidosis and metabolic acidosis   Progressive decline on 4 pressors   Unable to ventilate to achieve adequate paco2 or pao2 - on 100 % fio2 mv 18 l   Did not sumeet cvvhd   Prognoses guarded   Critical   Son updated   Total critical care time caring for this patient with life threatening, unstable organ failure, including direct patient contact, management of life support systems, review of data including imaging and labs, discussions with other team members and physicians at least 39 Min so far today, excluding procedures. Treatment plan Discussed with nursing staff in detail , all questions answered . Electronically signed by Arleen Rai MD on   11/17/20 at 5:50 PM EST    Please note that this chart was generated using voice recognition Dragon dictation software. Although every effort was made to ensure the accuracy of this automated transcription, some errors in transcription may have occurred.

## 2020-11-17 NOTE — PROCEDURES
PROCEDURE NOTE - Wally catheter for dialysis     PATIENT NAME: Sam Kelley RECORD NO. [id number]  DATE: 11/17/2020  ATTENDING PHYSICIAN: Dr Flora Bhatti DIAGNOSIS:  marisa , for cvvhd   POSTOPERATIVE DIAGNOSIS:  Same  PROCEDURE PERFORMED:  Left Internal Jugular Vein Wally catheter for dialysis   PERFORMING PHYSICIAN: Sandra Montague MD  ANESTHESIA:  Local utilizing 1% lidocaine  ESTIMATED BLOOD LOSS:  Less than 25 ml  COMPLICATIONS:  None immediately appreciated. DISCUSSION:  Kamala Kirk is a 68y.o.-year-old male who requires central IV access. The history and physical examination were reviewed and confirmed. The diagnoses, proposed procedure, risks, possible complications, benefits and alternatives were discussed with the family. He was given the opportunity to ask questions, and once answered, informed consent was obtained. The patient was then prepared for the procedure. PROCEDURE:  A timeout was initiated by the bedside nurse and was confirmed by those present. The patient was placed in a supine position. The skin overlying the Left Internal Jugular Vein was prepped with chlorhexadine and draped in sterile fashion. The skin was infiltrated with local anesthetic. The vessel and surrounding anatomy was visualized using ultrasound prob. Through the anesthetized region, the introducer needle was inserted into the internal jugular vein returning dark red non pulsatile blood. A guidewire was placed through the center of the needle with no resistance. Ultrasound confirmed presence of wire in the left jugular vein. A small incision made in the skin with a #11 scalpel blade. The 2 dilators were inserted into the skin and vein over guidewire using Seldinger technique. The 2 dilators were then removed and the catheter was placed in the vein over the guidewire using Seldinger technique. The guidewire was then removed and all ports aspirated and flushed appropriately.  The catheter then secured using silk suture and a temporary sterile dressing was applied. No immediate complication was evident. All sponge, instrument and needle counts were correct at the completion of the procedure. A post procedure X-ray was ordered and is still pending at this time. The patient tolerated the procedure well with no immediate complication evident. Korey Ceron MD.  Internal Medicine Resident PGY [address] [place] [address]   11/17/2020, 11:41 AM    Attending Physician Statement  I have discussed the care of Sal Yao, including pertinent history and exam findings,  with the resident. I have seen and examined the patient and the key elements of all parts of the encounter have been performed by me. I agree with the assessment, plan and orders as documented by the resident with additions . Treatment plan Discussed with nursing staff in detail , all questions answered . Electronically signed by Noreen Taylor MD on   11/17/20 at 5:45 PM EST    Please note that this chart was generated using voice recognition Dragon dictation software. Although every effort was made to ensure the accuracy of this automated transcription, some errors in transcription may have occurred.

## 2020-11-17 NOTE — PLAN OF CARE
Nutrition Problem #1: Inadequate oral intake  Intervention: Food and/or Nutrient Delivery: (Start nutrition as able.  If TF warranted, suggest Renal formula with goal rate of 50 mL/hr (2160 kcal and 97 g pro/day))  Nutritional Goals: meet % of estimated nutrient needs

## 2020-11-17 NOTE — PROGRESS NOTES
Comprehensive Nutrition Assessment    Type and Reason for Visit:  Initial (vent)     Nutrition Recommendations/Plan: Start nutrition as able. If TF warranted, suggest Renal formula with goal rate of 50 mL/hr (2160 kcal and 97 g pro/day). Will follow/monitor plans. Nutrition Assessment:  Pt admitted with c/o chest pain and SOB. PMH includes: CHF, DM, GERD, HTN, HLD. Pt intubated yesterday. Noted plan for CRRT. No nutrition at present. Meds/Labs reviewed. Malnutrition Assessment:  Malnutrition Status:  Insufficient data    Context:  Chronic Illness     Findings of the 6 clinical characteristics of malnutrition:  Energy Intake:  Unable to assess  Weight Loss:  Unable to assess     Body Fat Loss:  No significant body fat loss     Muscle Mass Loss:  No significant muscle mass loss    Fluid Accumulation:  Moderate fluid accumulation, Extremities   Strength:  Not Performed    Estimated Daily Nutrient Needs:  Energy (kcal):  4349-7218 kcal/day; Weight Used for Energy Requirements:  Ideal     Protein (g):  130 g pro/day; Weight Used for Protein Requirements:  Ideal(1.5)          Current Nutrition Therapies:    DIET CARB CONTROL; Carb Control: 4 carb choices (60 gms)/meal; Low Sodium (2 GM); Daily Fluid Restriction: 1500 ml--currently NPO d/t intubation     Anthropometric Measures:  · Height: 6' 2\" (188 cm)  · Current Body Weight: 242 lb 15.2 oz (110.2 kg)   · Ideal Body Weight: 190 lbs; % Ideal Body Weight  127%   · BMI: 31.2  · BMI Categories: Obese Class 1 (BMI 30.0-34. 9)       Nutrition Diagnosis:   · Inadequate oral intake related to impaired respiratory function as evidenced by NPO or clear liquid status due to medical condition    Nutrition Interventions:   Food and/or Nutrient Delivery:  Start nutrition as able.  If TF warranted, suggest Renal formula with goal rate of 50 mL/hr (2160 kcal and 97 g pro/day)  Nutrition Education/Counseling:  Pt not appropriate for education    Coordination of Nutrition Care:  Continue to monitor while inpatient    Goals:  meet % of estimated nutrient needs (goal set)     Nutrition Monitoring and Evaluation:   Food/Nutrient Intake Outcomes:  Diet Advancement/Tolerance  Physical Signs/Symptoms Outcomes:  Biochemical Data, Fluid Status or Edema, Hemodynamic Status, Nutrition Focused Physical Findings, Skin, Weight     Electronically signed by Sean Vick RD, LD on 11/17/20 at 1:52 PM EST    Contact: [phone]

## 2020-11-17 NOTE — CONSULTS
failure ([facility] [J96.01] 11/16/2020    Troponin level elevated [R77.8] 11/16/2020    Hypotension-possibly due to nitro drip [I95.9] 11/16/2020    Precordial pain [R07.2] 11/16/2020    DM (diabetes mellitus) ([facility] [E11.9]     GERD (gastroesophageal reflux disease) [K21.9]     HTN (hypertension) [I10]        PAST MEDICAL HISTORY      Diagnosis Date    Back pain     [facility]     DM (diabetes mellitus) ([facility]     Erectile dysfunction     GERD (gastroesophageal reflux disease)     HTN (hypertension)     Hyperlipemia        PAST SURGICAL HISTORY  Past Surgical History:   Procedure Laterality Date    TONSILLECTOMY         SOCIAL HISTORY  Social History     Tobacco Use    Smoking status: Never Smoker   Substance Use Topics    Alcohol use: No    Drug use: No       ALLERGIES  Allergies   Allergen Reactions    Aleve [Naproxen Sodium]     Pcn [Penicillins]          MEDICATIONS  Current Medications    cefTRIAXone (ROCEPHIN) IV  2 g Intravenous Q24H    albuterol  2.5 mg Nebulization BID    hydrocortisone sodium succinate PF  100 mg Intravenous Q8H    aspirin  81 mg Oral Daily    ezetimibe  10 mg Oral Daily    gabapentin  300 mg Oral Daily    atorvastatin  10 mg Oral Daily    sodium chloride flush  10 mL Intravenous 2 times per day    enoxaparin  30 mg Subcutaneous BID    azithromycin  500 mg Intravenous Q24H    ipratropium-albuterol  1 ampule Inhalation 4x daily    vancomycin  1,250 mg Intravenous Q12H    vancomycin (VANCOCIN) intermittent dosing (placeholder)   Other RX Placeholder     glucose, dextrose, glucagon (rDNA), dextrose, albuterol, sodium chloride flush, acetaminophen **OR** acetaminophen, polyethylene glycol, promethazine **OR** ondansetron, nicotine, perflutren lipid microspheres  IV Drips/Infusions   insulin 30.7 Units/hr (11/17/20 0825)    dextrose      EPINEPHrine infusion 10 mcg/min (11/17/20 0237)    sodium bicarbonate infusion 150 mL/hr at 11/17/20 2424    norepinephrine 60 mcg/min (11/17/20 0825)    vasopressin (Septic Shock) infusion 0.04 Units/min (11/17/20 0018)    phenylephrine (GEMMA-SYNEPHRINE) 50mg/250mL infusion 300 mcg/min (11/17/20 0737)    midazolam Stopped (11/17/20 0201)     Home Medications  No current facility-administered medications on file prior to encounter. Current Outpatient Medications on File Prior to Encounter   Medication Sig Dispense Refill    NEURONTIN 600 MG tablet TAKE ONE-HALF TABLET BY MOUTH ONCE DAILY 90 tablet 0    lisinopril (ZESTRIL) 40 MG tablet Take 1 tablet by mouth daily. 30 tablet 3    omeprazole (PRILOSEC) 20 MG capsule Take 1 capsule by mouth daily. 30 capsule 1    furosemide (LASIX) 20 MG tablet Take 1 tablet by mouth 2 times daily. 60 tablet 1    ezetimibe (ZETIA) 10 MG tablet Take 1 tablet by mouth daily. 30 tablet 1    sitaGLIPtan (JANUVIA) 100 MG tablet Take 1 tablet by mouth daily. 30 tablet 6    PROAIR  (90 BASE) MCG/ACT inhaler INHALE TWO PUFFS BY MOUTH EVERY 4 TO 6 HOURS AS NEEDED FOR SHORTNESS OF BREATH 8.5 g 4    simvastatin (ZOCOR) 10 MG tablet Take 6 pills of zocor nightly 180 tablet 3    sitagliptan (JANUVIA) 100 MG tablet Take 1 tablet by mouth daily. 30 tablet 3    hydrochlorothiazide (HYDRODIURIL) 25 MG tablet Take 1 tablet by mouth daily. 90 tablet 3    aspirin 81 MG EC tablet Take 1 tablet by mouth daily. 30 tablet 5    ibuprofen (ADVIL;MOTRIN) 800 MG tablet Take 1 tablet by mouth every 6 hours as needed. 30 tablet 1    diphenhydrAMINE (ALLERGY RELIEF) 25 MG capsule Take 25 mg by mouth every 6 hours as needed.  hydrocortisone 1 % cream Apply  topically 2 times daily. Apply topically 2 times daily.  Clotrimazole-Betamethasone (LOTRISONE EX) Apply  topically.  lisinopril (ZESTRIL) 40 MG tablet Take 1 tablet by mouth daily for 360 days. 90 tablet 3    Insulin Pen Needle ([employer] PEN NEEDLES 31G) 31G X 8 MM MISC by Does not apply route.  100 each 6    metformin (GLUCOPHAGE) 1000 MG tablet Take 1,000 mg by mouth 2 times daily (with meals).  pioglitazone (ACTOS) 15 MG tablet Take 15 mg by mouth daily.  clotrimazole (LOTRIMIN) 1 % cream Apply  topically 2 times daily. Apply topically 2 times daily. Data         BP (!) 71/42   Pulse 97   Temp 97 °F (36.1 °C)   Resp (!) 33   Ht 6' 2\" (1.88 m)   Wt 242 lb 15.2 oz (110.2 kg)   SpO2 (!) 86%   BMI 31.19 kg/m²     Wt Readings from Last 3 Encounters:   11/16/20 242 lb 15.2 oz (110.2 kg)   01/20/12 257 lb 9.6 oz (116.8 kg)   11/29/11 260 lb (117.9 kg)        Code Status: Full Code     ADVANCED CARE PLANNING:  Patient has capacity for medical decisions: no  Health Care Power of : no  Living Will: no     Personal, Social, and Family History  Marital Status:   Living situation: alone  Does patient understand diagnosis/treatment? no  Does caregiver understand diagnosis/treatment? yes      Assessment        REVIEW OF SYSTEMS  ROS unable to be done, patient intubated  Constitutional: no fever, no chills or weight loss  Eyes: no eye pain or blurred vision  ENT: no hearing loss, congestion, or difficulty swallowing   Respiratory: no wheezing, chest tightness, or shortness of breath   Cardiovascular: no chest pain or pressure, no palpitations, no diaphoresis   Gastrointestinal: no nausea, vomiting,abdominal pain, diarrhea or constipation, no melena   Genitourinary: no dysuria, frequency, hematuria, or nocturia   Musculoskeletal: no myalgias or arthralgias, no back pain   Skin: no rashes or sores   Neurological: no focal weakness, numbness, tingling or headache, no seizures    PHYSICAL ASSESSMENT:  Constitutional: Intubated, sedated  Head: Normocephalic and atraumatic. Eyes: EOM are normal. Pupils are equal, round. Neck: Normal range of motion. Neck supple. No tracheal deviation present. Cardiovascular: Normal rate and regular rhythm, S1, S2, no murmur.   Pulmonary/Chest: On sister Lisa Cagle and brother Cassy Gan further said that he has not been in touch with his brother Sulaiman Crawford for more than 2 years and has no contact information for him    Patient's daughter Lisa Cagle called during our conversation with Lucas Block and both myself and Dr. Houston Contreras talked with Lisa Cagel and updated her regarding patient's current medical conditions    I explained to Lisa Cagle that since patient has 5 children and no POA paperwork as stated by them thus 3 out of patient's 5 children will be patient's legal spokesperson in making decisions regarding his health care    Lisa Cagle told that she will try to contact her brother Sulaiman Crawford    We explained to Lisa Cagle that patient was multiple pressors to maintain his blood pressure and may be started on CVVHD by the nephrology team    I explained the different types of codes to Lisa Cagle and she stated that would not want the patient to have CPR/chest compressions to be done if his heart stopped and I explained to her that the [address] can be changed once 3 out of patient's 5 children agree with the CODE STATUS change otherwise patient's CODE STATUS will remain as full code and week he stated that she understands and will try to get as soon as possible with Sulaiman Crawford    I explained the different types of codes to Lucas Block and he also was told that he would want the patient's code to change to DNR CCA with intubation and treatment to continue but we explained him that 3 of the patient's 5 children can together make this decision    We offered comfort and emotional support to the family    Education/support to staff  Education/support to family  Education/support to patient  Discharge planning/helping to coordinate care  Communications with primary service  Caregiver support/education  Code status clarified: Full Code  Code status clarified: [address]  Code status clarified: DNRCCA  Other major issues     Principle Problem/Diagnosis:  Acute on chronic systolic congestive heart failure ([address]    Additional Assessments:   Principal Problem:    Acute on chronic systolic congestive heart failure (HCC)  Active Problems:    GERD (gastroesophageal reflux disease)    HTN (hypertension)    DM (diabetes mellitus) ([facility]    Pneumonia due to infectious organism    Acute hypoxemic respiratory failure (HCC)    Troponin level elevated    Hypotension-possibly due to nitro drip    Precordial pain    1- Symptom management/ pain control     Pain Assessment:  Pain is controlled with current analgesics. Medication(s) being used: acetaminophen. Anxiety:  none                          Dyspnea:  none                          Fatigue:  none    Other: Intubated    We feel the patient symptoms are being controlled. his current regimen is reviewed by myself and discussed with the staff. We will continue to discuss further goals of care for the patient with the family    2- Goals of care evaluation   The patient goals of care are spiritual needs, strengthening relationships, preserve independence/autonomy/control and support for family/caregiver   Goals of care discussed with:    [] Patient independently    [] Patient and Family    [x] Family or Healthcare DPOA independently    [] Unable to discuss with patient, family/DPOA not present    3- Code Status  Full Code    4- Other recommendations   - We will continue to provide comfort and support to the patient and the family  Please call with any palliative questions or concerns. Palliative Care Team is available via perfect serve or via phone. Palliative Care will continue to follow Mr. Jo's care as needed. Thank you for allowing Palliative Care to participate in the care of Mr. Mi Reynaga . This note has been dictated by dragon, typing errors may be a possibility.     The total time I spent in seeing the patient, discussing goals of care, advanced directives, code status and other major issues was more than 55 minutes      Electronically signed by   Tian Becker MD  Palliative Care Team  on 11/17/2020 at 9:18 AM    Palliative care office: [phone]

## 2020-11-17 NOTE — PROGRESS NOTES
Insert Arterial Line  Date/Time:  11/17/20, 2:00AM  Performed by: Kayley Ta    Patient identity confirmed: arm band and provided demographic data   Time out: Immediately prior to procedure a \"time out\" was called to verify the correct patient, procedure, equipment, support staff. Preparation: Patient was prepped and draped in the usual sterile fashion.     Location:right radial    Axel's test normal: yes  Needle gauge: 20     Number of attempts: 1  Post-procedure: transparent dressing applied and line secured    Patient tolerance: well

## 2020-11-17 NOTE — H&P
[address]      Patient's Name/ Date of Birth/ Gender: Ramesh Gross / 1940 (68 y.o.) / male     Referring Physician: Car Ortega MD    Consulting Physician: Dr. Shawn Louis    History of present Illness: Pt is a 68 y.o. male, who presented to the hospital in acute respiratory distress secondary septic shock from left lower lobe pneumonia and acute COPD exacerbation. While patient was undergoing resuscitation, the left forearm IV infiltrated while Levophed and propofol running. Infiltration recognized after approximately 30 minutes of infusion. Patient had some mild swelling of the left forearm. Vascular surgery was consulted due to concerns for Levophed in the subcutaneous tissue. Patient has history of hypertension, diabetes type 2, and congestive heart failure. Patient takes lisinopril, Zocor, hydrochlorothiazide, and aspirin daily. Past Medical History:  has a past medical history of Back pain, CHF (congestive heart failure) ([facility] DM (diabetes mellitus) ([facility] Erectile dysfunction, GERD (gastroesophageal reflux disease), HTN (hypertension), and Hyperlipemia. Past Surgical History:   Past Surgical History:   Procedure Laterality Date    TONSILLECTOMY         Social History:  reports that he has never smoked. He does not have any smokeless tobacco history on file. He reports that he does not drink alcohol or use drugs. Family History: family history includes Cancer in his father; Heart Disease in his mother.     Allergies: Aleve [naproxen sodium] and Pcn [penicillins]    Current Meds:  Current Facility-Administered Medications:     aspirin EC tablet 81 mg, 81 mg, Oral, Daily, Juan Pablo Smiles, APRN - CNP    ezetimibe (ZETIA) tablet 10 mg, 10 mg, Oral, Daily, Juan Pablo Smiles, APRN - CNP    gabapentin (NEURONTIN) tablet 300 mg, 300 mg, Oral, Daily, Juan Pablo Smiles, APRN - CNP    albuterol (PROVENTIL) nebulizer solution 2.5 mg, 2.5 injection, 10 mcg/kg/min, Intravenous, Continuous, Denis Kaiser MD, Last Rate: 26.4 mL/hr at 11/16/20 1640, 40 mcg/kg/min at 11/16/20 1640    famotidine (PEPCID) injection 20 mg, 20 mg, Intravenous, BID, Denis Kaiser MD    cefTRIAXone (ROCEPHIN) 1 g IVPB in 50 mL D5W minibag, 1 g, Intravenous, Q24H, Denis Kaiser MD    azithromycin (ZITHROMAX) 500 mg in dextrose 5% 250 mL IVPB, 500 mg, Intravenous, Q24H, Denis Kaiser MD    ipratropium-albuterol (DUONEB) nebulizer solution 1 ampule, 1 ampule, Inhalation, 4x daily, Denis Kaiser MD    [START ON 11/17/2020] albuterol (PROVENTIL) nebulizer solution 2.5 mg, 2.5 mg, Nebulization, BID, Denis Kaiser MD    insulin lispro (HUMALOG) injection vial 0-18 Units, 0-18 Units, Subcutaneous, Q6H, Rupert Dey MD    0.9 % sodium chloride infusion, , Intravenous, Continuous, Vida Habermann, MD, Last Rate: 100 mL/hr at 11/16/20 1643    vasopressin 20 Units in dextrose 5 % 100 mL infusion, 0.04 Units/min, Intravenous, Continuous, Nash Fridge, DO, Last Rate: 12 mL/hr at 11/16/20 1907, 0.04 Units/min at 11/16/20 1907    fentaNYL (SUBLIMAZE) injection 50 mcg, 50 mcg, Intravenous, Once, [employer], DO    LORazepam (ATIVAN) 2 MG/ML injection, , , ,     0.9 % sodium chloride bolus, 500 mL, Intravenous, Once, Christophe Fridge, DO    phenylephrine (GEMMA-SYNEPHRINE) 50 mg in dextrose 5 % 250 mL infusion, 100 mcg/min, Intravenous, Continuous, Christophe Fridge, DO    REVIEW OF SYSTEMS:    Review of systems unable to be obtained due to patient's condition.   Intubated and sedated      PHYSICAL EXAM:    VITALS:  BP (!) 86/47   Pulse 117   Temp 102.4 °F (39.1 °C)   Resp (!) 34   Ht 6' 2\" (1.88 m)   Wt 242 lb 15.2 oz (110.2 kg)   SpO2 96%   BMI 31.19 kg/m²   INTAKE/OUTPUT:     Intake/Output Summary (Last 24 hours) at 11/16/2020 1927  Last data filed at 11/16/2020 1600  Gross per 24 hour   Intake --   Output 45 ml   Net -45 ml         CONSTITUTIONAL: Intubated and sedated  HEAD: normocephalic, atraumatic  EYES: Conjunctiva normal   ENT: Mucus membranes moist, No otorrhea, no rhinorrhea  NECK:  symmetrical, trachea midline   LUNGS: Symmetrical rise and fall of chest on ventilator  CARDIOVASCULAR: Tachycardic and regular rhythm  ABDOMEN: soft, non tender, non distended  : Deferred  Rectal:  Deferred   MUSCULOSKELETAL: Left lower extremity mild edema, compartments soft no erythema or signs of skin necrosis  SKIN: No abscess or rash  Ext: Palpable radial pulses  NEUROLOGIC: Sedated  PSYCH: Sedated      Labs:   Lab Results   Component Value Date    WBC 21.5 11/16/2020    HGB 14.0 11/16/2020    HCT 44.8 11/16/2020    MCV 94.1 11/16/2020     11/16/2020     Lab Results   Component Value Date     11/16/2020    K 4.6 11/16/2020     11/16/2020    CO2 20 11/16/2020    BUN 22 11/16/2020    CREATININE 1.08 11/16/2020    GLUCOSE 220 11/16/2020    GLUCOSE 201 12/20/2011    CALCIUM 9.1 11/16/2020     Lab Results   Component Value Date    INR 1.0 11/16/2020       Imaging:  XR CHEST PORTABLE   Final Result   Left lower lobe airspace disease continues to worsen. Mild right basilar   airspace disease not significantly changed. XR CHEST PORTABLE   Final Result   A right IJ central venous catheter has been placed in satisfactory position. No other change from 2 hours earlier. Left-greater-than-right bibasilar   airspace disease suspicious for pneumonia. XR CHEST PORTABLE   Final Result   Endotracheal tube in place above the lukasz      Left lower lobe opacity could represent atelectasis or pneumonia         CT CHEST PULMONARY EMBOLISM W CONTRAST   Final Result   Limited evaluation for pulmonary embolus due to motion. No gross evidence of   large central pulmonary embolus. Consolidation within the left lower lobe may represent pneumonia with   malignancy not excluded. Close follow-up is recommended. Consider 3 months.          XR CHEST PORTABLE Final Result   Suggesting mild pulmonary vascular congestion. Otherwise, unremarkable single upright portable AP view of the chest.         XR CHEST PORTABLE    (Results Pending)   VL DUP LOWER EXTREMITY VENOUS BILATERAL    (Results Pending)   XR CHEST PORTABLE    (Results Pending)           Impression:  Patient Active Problem List   Diagnosis    Back pain    GERD (gastroesophageal reflux disease)    HTN (hypertension)    [facility]    Hyperlipemia    Erectile dysfunction    [facility]    Hypercalcemia    Heart failure ([facility])    Acute on chronic systolic congestive heart failure ([facility]    Pneumonia due to infectious organism    Acute hypoxemic respiratory failure ([facility])    Troponin level elevated    Hypotension-possibly due to nitro drip    Precordial pain       1. Left forearm IV infiltration    Recommendation:    1. Continue supportive care per primary  2. Continue to monitor left forearm for skin necrosis or skin breakdown. Continue left radial pulse checks  3. Vascular surgery service will sign off at this time. Thank you for the consult. Please call/page us if you have any questions/concerns. We appreciate being involved in the care of your patient.     Atha Bumpers  11/16/2020

## 2020-11-17 NOTE — PLAN OF CARE
Problem: Restraint Use - Nonviolent/Non-Self-Destructive Behavior:  Goal: Absence of restraint indications  Description: Absence of restraint indications  11/17/2020 0525 by Toribio Noyola RN  Outcome: Met This Shift  11/17/2020 0524 by Toribio Noyola RN  Outcome: Ongoing  Goal: Absence of restraint-related injury  Description: Absence of restraint-related injury  11/17/2020 0525 by Toribio Noyola RN  Outcome: Met This Shift  11/17/2020 0524 by Toribio Noyola RN  Outcome: Ongoing

## 2020-11-17 NOTE — PROGRESS NOTES
Occupational Therapy Not Seen Note    DATE: 2020  Name: Filemon London  : 1940  MRN: [id number]    Patient not available for Occupational Therapy due to:    Sedation (versed) / intubated    Next Scheduled Treatment: 2020    Electronically signed by Donnalee Moritz, OTR/L on 2020 at 10:14 AM

## 2020-11-17 NOTE — PROGRESS NOTES
CVVHD was initiated on the pt and 10 minutes in to treatment, pt had a drop in Blood pressure. Pt was maintaining pressure in the 22'K systolic with end tidal dropping to 17. Dr Nico Haro was in the room and made the decision to stop treatment because pt was not tolerating.

## 2020-11-17 NOTE — PROGRESS NOTES
Pharmacy Vancomycin Consult     Vancomycin Day: 2  Current Dosin mg q12h     Temp max:  39.1 C    Recent Labs     20  0611 20  0803   BUN 35* 34*       Recent Labs     20  0611 20  0803   CREATININE 2.64* 2.68*       Recent Labs     20  0220 20  0611   WBC 37.4* 38.8*         Intake/Output Summary (Last 24 hours) at 2020 1220  Last data filed at 2020 1203  Gross per 24 hour   Intake 9336.87 ml   Output 201 ml   Net 9135.87 ml       Culture Date      Source                       Results                    Blood                          No growth x 1 day                   Sputum                       Few GPC in pairs                    MRSA nasal swab          Pending    Ht Readings from Last 1 Encounters:   20 6' 2\" (1.88 m)        Wt Readings from Last 1 Encounters:   20 242 lb 15.2 oz (110.2 kg)         Body mass index is 31.19 kg/m². Estimated Creatinine Clearance: 30 mL/min (A) (based on SCr of 2.68 mg/dL (H)). Assessment/Plan: Received 2 doses of vancomycin so far. Based on decline in renal function, will hold off on future scheduled doses and instead order future doses per random levels. Next level planned with AM labs tomorrow.     Sheri Anne, Vic, GARY, BCPS 2020 12:23 PM

## 2020-11-17 NOTE — PLAN OF CARE
Problem: Restraint Use - Nonviolent/Non-Self-Destructive Behavior:  Goal: Absence of restraint indications  Description: Absence of restraint indications  11/17/2020 0525 by Brenda Fowler RN  Outcome: Met This Shift  11/17/2020 0524 by Brenda Fowler RN  Outcome: Ongoing  Goal: Absence of restraint-related injury  Description: Absence of restraint-related injury  11/17/2020 0525 by Brenda Fowler RN  Outcome: Met This Shift  11/17/2020 0524 by Brenda Fowler RN  Outcome: Ongoing     Problem: Pain:  Goal: Control of chronic pain  Description: Control of chronic pain  11/17/2020 0525 by Brenda Fowler RN  Outcome: Ongoing  11/17/2020 0524 by Brenda Fowler RN  Outcome: Ongoing     Problem: Falls - Risk of:  Goal: Will remain free from falls  Description: Will remain free from falls  11/17/2020 0525 by Brenda Fowler RN  Outcome: Ongoing  11/17/2020 0524 by Brenda Fowler RN  Outcome: Ongoing  Goal: Absence of physical injury  Description: Absence of physical injury  11/17/2020 0525 by Brenda Fowler RN  Outcome: Ongoing  11/17/2020 0524 by Brenda Fowler RN  Outcome: Ongoing     Problem: Breathing Pattern - Ineffective:  Goal: Ability to achieve and maintain a regular respiratory rate will improve  Description: Ability to achieve and maintain a regular respiratory rate will improve  11/17/2020 0525 by Brenda Fowler RN  Outcome: Ongoing  11/17/2020 0524 by Brenda Fowler RN  Outcome: Ongoing     Problem: OXYGENATION/RESPIRATORY FUNCTION  Goal: Patient will maintain patent airway  11/17/2020 0525 by Brenda Fowler RN  Outcome: Ongoing  11/17/2020 0524 by Brenda Fowler RN  Outcome: Ongoing  11/16/2020 2029 by Tobias Dowd RCP  Outcome: Ongoing  Goal: Patient will achieve/maintain normal respiratory rate/effort  Description: Respiratory rate and effort will be within normal limits for the patient  11/17/2020 0525 by Blanca Matthews Michele Rios RN  Outcome: Ongoing  11/17/2020 0524 by Maribel Hammond RN  Outcome: Ongoing  11/16/2020 2029 by Mook Mcgill RCP  Outcome: Ongoing     Problem: MECHANICAL VENTILATION  Goal: Patient will maintain patent airway  11/17/2020 0525 by Maribel Hammond RN  Outcome: Ongoing  11/17/2020 0524 by Maribel Hammond RN  Outcome: Ongoing  11/16/2020 2029 by Mook Mcgill RCP  Outcome: Ongoing  Goal: Oral health is maintained or improved  11/17/2020 0525 by Maribel Hammond RN  Outcome: Ongoing  11/17/2020 0524 by Maribel Hammond RN  Outcome: Ongoing  11/16/2020 2029 by AIMEE SalesP  Outcome: Ongoing  Goal: ET tube will be managed safely  11/17/2020 0525 by Maribel Hammond RN  Outcome: Ongoing  11/17/2020 0524 by Maribel Hammond RN  Outcome: Ongoing  11/16/2020 2029 by Mook Mcgill RCP  Outcome: Ongoing  Goal: Ability to express needs and understand communication  11/17/2020 0525 by Maribel Hammond RN  Outcome: Ongoing  11/17/2020 0524 by Maribel Hammond RN  Outcome: Ongoing  11/16/2020 2029 by Mook Mcgill RCP  Outcome: Ongoing  Goal: Mobility/activity is maintained at optimum level for patient  11/17/2020 0525 by Maribel Hammond RN  Outcome: Ongoing  11/17/2020 0524 by Maribel Hammond RN  Outcome: Ongoing  11/16/2020 2029 by Mook Mcgill RCP  Outcome: Ongoing     Problem: SKIN INTEGRITY  Goal: Skin integrity is maintained or improved  11/17/2020 0525 by Maribel Hammond RN  Outcome: Ongoing  11/17/2020 0524 by Maribel Hammond RN  Outcome: Ongoing  11/16/2020 2029 by Mook Mcgill RCP  Outcome: Ongoing     Problem: Confusion - Acute:  Goal: Absence of continued neurological deterioration signs and symptoms  Description: Absence of continued neurological deterioration signs and symptoms  11/17/2020 0525 by Maribel Hammond RN  Outcome: Ongoing  11/17/2020 0524 by Maribel Hammond RN  Outcome: Ongoing  Goal: Mental status will be restored to baseline  Description: Mental status will be restored to baseline  11/17/2020 0525 by Snow Alberto RN  Outcome: Ongoing  11/17/2020 0524 by Snow Alberto RN  Outcome: Ongoing     Problem: Discharge Planning:  Goal: Ability to perform activities of daily living will improve  Description: Ability to perform activities of daily living will improve  11/17/2020 0525 by Snow Alberto RN  Outcome: Ongoing  11/17/2020 0524 by Snow Alberto RN  Outcome: Ongoing  Goal: Participates in care planning  Description: Participates in care planning  11/17/2020 0525 by Snow Alberto RN  Outcome: Ongoing  11/17/2020 0524 by Snow Alberto RN  Outcome: Ongoing  Goal: Discharged to appropriate level of care  Description: Discharged to appropriate level of care  Outcome: Ongoing     Problem: Injury - Risk of, Physical Injury:  Goal: Will remain free from falls  Description: Will remain free from falls  11/17/2020 0525 by Snow Alberto RN  Outcome: Ongoing  11/17/2020 0524 by Snow Alberto RN  Outcome: Ongoing  Goal: Absence of physical injury  Description: Absence of physical injury  11/17/2020 0525 by Snow Alberto RN  Outcome: Ongoing  11/17/2020 0524 by Snow Alberto RN  Outcome: Ongoing     Problem: Mood - Altered:  Goal: Mood stable  Description: Mood stable  11/17/2020 0525 by Snow Alberto RN  Outcome: Ongoing  11/17/2020 0524 by Snow Alberto RN  Outcome: Ongoing  Goal: Absence of abusive behavior  Description: Absence of abusive behavior  11/17/2020 0525 by Snow Alberto RN  Outcome: Ongoing  11/17/2020 0524 by Snow Alberto RN  Outcome: Ongoing  Goal: Verbalizations of feeling emotionally comfortable while being cared for will increase  Description: Verbalizations of feeling emotionally comfortable while being cared for will increase  11/17/2020 0525 by Snow Alberto RN  Outcome: Ongoing  11/17/2020 0524 by Lizett Lomax RN  Outcome: Ongoing     Problem: Psychomotor Activity - Altered:  Goal: Absence of psychomotor disturbance signs and symptoms  Description: Absence of psychomotor disturbance signs and symptoms  11/17/2020 0525 by Lizett Lomax RN  Outcome: Ongoing  11/17/2020 0524 by Lizett Lomax RN  Outcome: Ongoing     Problem: Sensory Perception - Impaired:  Goal: Demonstrations of improved sensory functioning will increase  Description: Demonstrations of improved sensory functioning will increase  11/17/2020 0525 by Lizett Lomax RN  Outcome: Ongoing  11/17/2020 0524 by Lizett Lomax RN  Outcome: Ongoing  Goal: Decrease in sensory misperception frequency  Description: Decrease in sensory misperception frequency  11/17/2020 0525 by Lizett Lomax RN  Outcome: Ongoing  11/17/2020 0524 by Lizett Lomax RN  Outcome: Ongoing  Goal: Able to refrain from responding to false sensory perceptions  Description: Able to refrain from responding to false sensory perceptions  11/17/2020 0525 by Lizett Lomax RN  Outcome: Ongoing  11/17/2020 0524 by Lizett Lomax RN  Outcome: Ongoing  Goal: Demonstrates accurate environmental perceptions  Description: Demonstrates accurate environmental perceptions  11/17/2020 0525 by Lizett Lomax RN  Outcome: Ongoing  11/17/2020 0524 by Lizett Lomax RN  Outcome: Ongoing  Goal: Able to distinguish between reality-based and nonreality-based thinking  Description: Able to distinguish between reality-based and nonreality-based thinking  11/17/2020 0525 by Lizett Lomax RN  Outcome: Ongoing  11/17/2020 0524 by Lizett Lomax RN  Outcome: Ongoing  Goal: Able to interrupt nonreality-based thinking  Description: Able to interrupt nonreality-based thinking  11/17/2020 0525 by Lizett Lomax RN  Outcome: Ongoing  11/17/2020 0524 by Lizett Lomax RN  Outcome: Ongoing Problem: Sleep Pattern Disturbance:  Goal: Appears well-rested  Description: Appears well-rested  11/17/2020 0525 by Greyson Burns RN  Outcome: Ongoing  11/17/2020 0524 by Greyson Burns RN  Outcome: Ongoing     Problem: Skin Integrity:  Goal: Will show no infection signs and symptoms  Description: Will show no infection signs and symptoms  11/17/2020 0525 by Greyson Burns RN  Outcome: Ongoing  11/17/2020 0524 by Greyson Burns RN  Outcome: Ongoing  Goal: Absence of new skin breakdown  Description: Absence of new skin breakdown  11/17/2020 0525 by Greyson Burns RN  Outcome: Ongoing  11/17/2020 0524 by Greyson Burns RN  Outcome: Ongoing     Problem: Airway Clearance - Ineffective:  Goal: Ability to maintain a clear airway will improve  Description: Ability to maintain a clear airway will improve  Outcome: Ongoing     Problem: Anxiety/Stress:  Goal: Level of anxiety will decrease  Description: Level of anxiety will decrease  Outcome: Ongoing     Problem: Aspiration:  Goal: Absence of aspiration  Description: Absence of aspiration  Outcome: Ongoing     Problem: Cardiac Output - Decreased:  Goal: Hemodynamic stability will improve  Description: Hemodynamic stability will improve  Outcome: Ongoing     Problem: Fluid Volume - Imbalance:  Goal: Absence of imbalanced fluid volume signs and symptoms  Description: Absence of imbalanced fluid volume signs and symptoms  Outcome: Ongoing     Problem: Gas Exchange - Impaired:  Goal: Levels of oxygenation will improve  Description: Levels of oxygenation will improve  Outcome: Ongoing     Problem: Mental Status - Impaired:  Goal: Mental status will be restored to baseline  Description: Mental status will be restored to baseline  11/17/2020 0525 by Greyson Burns RN  Outcome: Ongoing  11/17/2020 0524 by Greyson Burns RN  Outcome: Ongoing     Problem: Nutrition Deficit:  Goal: Ability to achieve adequate nutritional intake will improve  Description: Ability to achieve adequate nutritional intake will improve  Outcome: Ongoing     Problem: Pain:  Goal: Pain level will decrease  Description: Pain level will decrease  Outcome: Ongoing  Goal: Recognizes and communicates pain  Description: Recognizes and communicates pain  Outcome: Ongoing  Goal: Control of acute pain  Description: Control of acute pain  Outcome: Ongoing  Goal: Control of chronic pain  Description: Control of chronic pain  Outcome: Ongoing     Problem: Serum Glucose Level - Abnormal:  Goal: Ability to maintain appropriate glucose levels will improve to within specified parameters  Description: Ability to maintain appropriate glucose levels will improve to within specified parameters  Outcome: Ongoing     Problem: Skin Integrity - Impaired:  Goal: Will show no infection signs and symptoms  Description: Will show no infection signs and symptoms  Outcome: Ongoing  Goal: Absence of new skin breakdown  Description: Absence of new skin breakdown  Outcome: Ongoing     Problem: Tissue Perfusion, Altered:  Goal: Circulatory function within specified parameters  Description: Circulatory function within specified parameters  Outcome: Ongoing     Problem: Tissue Perfusion - Cardiopulmonary, Altered:  Goal: Absence of angina  Description: Absence of angina  Outcome: Ongoing  Goal: Hemodynamic stability will improve  Description: Hemodynamic stability will improve  Outcome: Ongoing

## 2020-11-17 NOTE — PROGRESS NOTES
4:20 AM over the course of the night, patient has become significantly more ill is now maxed on 4 pressors. Recent lab work shows he has elevated creatinine, hyperkalemia, and blood gas shows that he is significantly acidotic. He has received several amps of bicarb as well as 2 doses of insulin as well as start on insulin drip. Despite this he still has refractory hyperkalemia and worsening renal function. I discussed with Dr. Layne Hernandez from nephrology. He recommended that we can increase the bicarb drip to 150 mEq at 150 cc/h, but that there is little else to offer him at this point given how critically ill he is, and likely his impaired perfusion to his kidneys. Will increase bicarb drip. Family is at bedside at this time and updated with results. His son Christiano Dumont is at the bedside who states that he is a power of  does not have paperwork available for us. He reports that the patient told him explicitly that he did not want to be resuscitated, but does not have the paperwork to prove that he is his designated power of . Explained that since he has 2 siblings with whom he has not been able to reach, the patient will remain full code until he is able to produce paperwork stating that he is is power of , or if he is able to reach his other siblings.     Electronically signed by Gerald Ruiz DO on 11/17/2020 at 5:13 AM

## 2020-11-17 NOTE — PROGRESS NOTES
Pharmacy Note  Vancomycin Consult    Tiffany Person is a 68 y.o. male started on Vancomycin for possible pneumonia; consult received from Dr. Binta Quezada to manage therapy. Also receiving the following antibiotics: Azithromycin and ceftriaxone. Patient Active Problem List   Diagnosis    Back pain    GERD (gastroesophageal reflux disease)    HTN (hypertension)    DM (diabetes mellitus) ([facility]    Hyperlipemia    Erectile dysfunction    CHF (congestive heart failure) ([facility])    Hypercalcemia    Heart failure ([facility])    Acute on chronic systolic congestive heart failure ([facility]    Pneumonia due to infectious organism    Acute hypoxemic respiratory failure ([facility])    Troponin level elevated    Hypotension-possibly due to nitro drip    Precordial pain       Allergies:  Aleve [naproxen sodium] and Pcn [penicillins]     Temp max: 99.5    Recent Labs     11/16/20  0520   BUN 22       Recent Labs     11/16/20  0518 11/16/20  0520   CREATININE CANNOT BE CALCULATED 1.08       Recent Labs     11/16/20  0520   WBC 21.5*         Intake/Output Summary (Last 24 hours) at 11/16/2020 2301  Last data filed at 11/16/2020 2200  Gross per 24 hour   Intake 2264.87 ml   Output 147 ml   Net 2117.87 ml       Culture Date      Source                       Results  pending    Ht Readings from Last 1 Encounters:   11/16/20 6' 2\" (1.88 m)        Wt Readings from Last 1 Encounters:   11/16/20 242 lb 15.2 oz (110.2 kg)         Body mass index is 31.19 kg/m². Estimated Creatinine Clearance: 74 mL/min (based on SCr of 1.08 mg/dL). Goal Trough Level: 14-17 mcg/mL    Assessment/Plan:  Will initiate Vancomycin  1250 mg IV every 12 hours. Timing of trough level will be determined based on culture results, renal function, and clinical response. Thank you for the consult. Will continue to follow.     Shamir Reina [facility]  11/16/2020 11:02 PM

## 2020-11-17 NOTE — PLAN OF CARE
Problem: OXYGENATION/RESPIRATORY FUNCTION  Goal: Patient will maintain patent airway  11/17/2020 0846 by Rohini Danielle RCP  Outcome: Ongoing  11/17/2020 0525 by Mingo Ruelas RN  Outcome: Ongoing  11/17/2020 0524 by Mingo Ruelas RN  Outcome: Ongoing  11/16/2020 2029 by Kedar Duarte RCP  Outcome: Ongoing     Problem: OXYGENATION/RESPIRATORY FUNCTION  Goal: Patient will achieve/maintain normal respiratory rate/effort  Description: Respiratory rate and effort will be within normal limits for the patient  11/17/2020 0846 by Rohini Danielle RCP  Outcome: Ongoing  11/17/2020 0525 by Mingo Ruelas RN  Outcome: Ongoing  11/17/2020 0524 by Mingo Ruelas RN  Outcome: Ongoing  11/16/2020 2029 by Kedar Duarte RCP  Outcome: Ongoing     Problem: MECHANICAL VENTILATION  Goal: Patient will maintain patent airway  11/17/2020 0846 by AIMEE ReganP  Outcome: Ongoing  11/17/2020 0525 by Mingo Ruelas RN  Outcome: Ongoing  11/17/2020 0524 by Mingo Ruelas RN  Outcome: Ongoing  11/16/2020 2029 by Kedar Duarte RCP  Outcome: Ongoing     Problem: MECHANICAL VENTILATION  Goal: Oral health is maintained or improved  11/17/2020 0846 by Rohini Danielle RCP  Outcome: Ongoing  11/17/2020 0525 by Mingo Ruelas RN  Outcome: Ongoing  11/17/2020 0524 by Mingo Ruelas RN  Outcome: Ongoing  11/16/2020 2029 by Kedar Duarte RCP  Outcome: Ongoing     Problem: MECHANICAL VENTILATION  Goal: ET tube will be managed safely  11/17/2020 0846 by Rohini Danielle RCP  Outcome: Ongoing  11/17/2020 0525 by Mingo Ruelas RN  Outcome: Ongoing  11/17/2020 0524 by Mingo Ruelas RN  Outcome: Ongoing  11/16/2020 2029 by Kedar Duarte RCP  Outcome: Ongoing     Problem: MECHANICAL VENTILATION  Goal: Ability to express needs and understand communication  11/17/2020 0846 by Rohini Danielle RCP  Outcome: Ongoing  11/17/2020 0525 by Mingo Ruelas RN  Outcome: Ongoing  11/17/2020 0524 by Damien Gallo RN  Outcome: Ongoing  11/16/2020 2029 by Rosmery Walker RCP  Outcome: Ongoing     Problem: MECHANICAL VENTILATION  Goal: Mobility/activity is maintained at optimum level for patient  11/17/2020 0525 by Damien Gallo RN  Outcome: Ongoing  11/17/2020 0524 by Damien Gallo RN  Outcome: Ongoing  11/16/2020 2029 by Rosmery Walker RCP  Outcome: Ongoing

## 2020-11-17 NOTE — PROGRESS NOTES
Arterial blood gas performed at 0225 did not save to pt Epic chart. Results are as follows:    pH 6.86  PaCO2 102.8   PaO2 69.0   HCO3- 18.5  BE -17.9    Dr. Patria Arndt notified in person. Copy printed and added to pt folder.

## 2020-11-17 NOTE — FLOWSHEET NOTE
Assessment: Pt is a 68 y.o. male, family states pt has respiratory distress and pneumonia. Per nurse report, Pt is very Ill and not doing well. Son was called in to the hospital with  at bedside. Intervention:  responded to nurse call. Pt was intubated and Pts son was very tearful about the medical condition of his father.  was bedside support offering prayer and words of comfort.  provided space for feelings and gave compassionate care. Outcome: Family was comforted and supported.  are available 24/7 via [address] [place] [address].       11/16/20 1039   Encounter Summary   Services provided to: Patient   Referral/Consult From: Multi-disciplinary team   Support System Family members   Continue Visiting   (11/16/20)   Complexity of Encounter Low   Length of Encounter 45 minutes   Spiritual Assessment Completed Yes   Spiritual/Evangelical   Type Spiritual support   Assessment Approachable   Intervention Explored feelings, thoughts, concerns   Outcome Comfort   Values / Beliefs   Do you have any ethnic, cultural, sacramental, or spiritual Religion needs you would like us to be aware of while you are in the hospital? No

## 2020-11-17 NOTE — CONSULTS
[address]      Patient's Name/ Date of Birth/ Gender: Kamala Kirk / 1940 (68 y.o.) / male     Referring Physician: Sonny Camacho MD    Consulting Physician: Dr. Estrada Boo    History of present Illness: Pt is a 68 y.o. male, who presented to the hospital in acute respiratory distress secondary septic shock from left lower lobe pneumonia and acute COPD exacerbation. While patient was undergoing resuscitation, the left forearm IV infiltrated while Levophed and propofol running. Infiltration recognized after approximately 30 minutes of infusion. Patient had some mild swelling of the left forearm. Vascular surgery was consulted due to concerns for Levophed in the subcutaneous tissue. Patient has history of hypertension, diabetes type 2, and congestive heart failure. Patient takes lisinopril, Zocor, hydrochlorothiazide, and aspirin daily. Past Medical History:  has a past medical history of Back pain, CHF (congestive heart failure) ([facility] DM (diabetes mellitus) ([facility] Erectile dysfunction, GERD (gastroesophageal reflux disease), HTN (hypertension), and Hyperlipemia. Past Surgical History:   Past Surgical History:   Procedure Laterality Date    TONSILLECTOMY         Social History:  reports that he has never smoked. He does not have any smokeless tobacco history on file. He reports that he does not drink alcohol or use drugs. Family History: family history includes Cancer in his father; Heart Disease in his mother.     Allergies: Aleve [naproxen sodium] and Pcn [penicillins]    Current Meds:  Current Facility-Administered Medications:     aspirin EC tablet 81 mg, 81 mg, Oral, Daily, Anola Prey, APRN - CNP    ezetimibe (ZETIA) tablet 10 mg, 10 mg, Oral, Daily, Anola Prey, APRN - CNP    gabapentin (NEURONTIN) tablet 300 mg, 300 mg, Oral, Daily, Anola Prey, APRN - CNP    albuterol (PROVENTIL) nebulizer solution 2.5 mg, 2.5 sedated  HEAD: normocephalic, atraumatic  EYES: Conjunctiva normal   ENT: Mucus membranes moist, No otorrhea, no rhinorrhea  NECK:  symmetrical, trachea midline   LUNGS: Symmetrical rise and fall of chest on ventilator  CARDIOVASCULAR: Tachycardic and regular rhythm  ABDOMEN: soft, non tender, non distended  : Deferred  Rectal:  Deferred   MUSCULOSKELETAL: Left lower extremity mild edema, compartments soft no erythema or signs of skin necrosis  SKIN: No abscess or rash  Ext: Palpable radial pulses  NEUROLOGIC: Sedated  PSYCH: Sedated      Labs:   Lab Results   Component Value Date    WBC 21.5 11/16/2020    HGB 14.0 11/16/2020    HCT 44.8 11/16/2020    MCV 94.1 11/16/2020     11/16/2020     Lab Results   Component Value Date     11/16/2020    K 4.6 11/16/2020     11/16/2020    CO2 20 11/16/2020    BUN 22 11/16/2020    CREATININE 1.08 11/16/2020    GLUCOSE 220 11/16/2020    GLUCOSE 201 12/20/2011    CALCIUM 9.1 11/16/2020     Lab Results   Component Value Date    INR 1.0 11/16/2020       Imaging:  XR CHEST PORTABLE   Final Result   Left lower lobe airspace disease continues to worsen. Mild right basilar   airspace disease not significantly changed. XR CHEST PORTABLE   Final Result   A right IJ central venous catheter has been placed in satisfactory position. No other change from 2 hours earlier. Left-greater-than-right bibasilar   airspace disease suspicious for pneumonia. XR CHEST PORTABLE   Final Result   Endotracheal tube in place above the lukasz      Left lower lobe opacity could represent atelectasis or pneumonia         CT CHEST PULMONARY EMBOLISM W CONTRAST   Final Result   Limited evaluation for pulmonary embolus due to motion. No gross evidence of   large central pulmonary embolus. Consolidation within the left lower lobe may represent pneumonia with   malignancy not excluded. Close follow-up is recommended. Consider 3 months.          XR CHEST PORTABLE Final Result   Suggesting mild pulmonary vascular congestion. Otherwise, unremarkable single upright portable AP view of the chest.         XR CHEST PORTABLE    (Results Pending)   VL DUP LOWER EXTREMITY VENOUS BILATERAL    (Results Pending)   XR CHEST PORTABLE    (Results Pending)           Impression:  Patient Active Problem List   Diagnosis    Back pain    GERD (gastroesophageal reflux disease)    HTN (hypertension)    DM [facility]di[facility] mellitus) [facility]    Hyperlipemia    Erectile dysfunction    [facility]    Hypercalcemia    Heart failure ([facility]    Acute on chronic systolic congestive heart failure ([facility]    Pneumonia due to infectious organism    Acute hypoxemic respiratory failure ([facility])    Troponin level elevated    Hypotension-possibly due to nitro drip    Precordial pain       1. Left forearm IV infiltration    Recommendation:    1. Continue supportive care per primary  2. Continue to monitor left forearm for skin necrosis or skin breakdown. Continue left radial pulse checks  3. Vascular surgery service will sign off at this time. Thank you for the consult. Please call/page us if you have any questions/concerns. We appreciate being involved in the care of your patient.     Brianne Villanueva  11/16/2020

## 2020-11-17 NOTE — PROGRESS NOTES
[id number] [facility]  Speech Language Pathology    Date: 11/17/2020  Patient Name: Josee Son  YOB: 1940   AGE: 68 y.o. MRN: [id number]        Patient Not Available for Speech Therapy     Due to:  [] Testing  [] Hemodialysis  [] Cancelled by RN  [] Surgery   [x] Intubation/Sedation/Pain Medication  [] Medical instability  [] Other:    Next scheduled treatment: as medically appropriate    Completed by:  Rohith Navarro M.S. [id number] [facility]

## 2020-11-18 NOTE — DISCHARGE SUMMARY
[address] [place] [address]     Department of Internal Medicine - Critical Care Service    INPATIENT DEATH SUMMARY      PATIENT IDENTIFICATION:  NAME:  Calista Richard   :   1940  MRN:    [id number]     Acct:    [de-identified]   Admit Date:  2020  Discharge date:  No discharge date for patient encounter. Attending Provider: Daryn Patel MD                                     Principal Problem:    Septic shock [facility])  Active Problems:    GERD (gastroesophageal reflux disease)    HTN (hypertension)    DM (diabetes mellitus) ([facility] [address]    Acute on chronic systolic congestive heart failure ([facility] [address]    Pneumonia due to infectious organism    Acute hypoxemic respiratory failure (HCC)    Troponin level elevated    Hypotension-possibly due to nitro drip    Precordial pain    IV infiltration, initial encounter  Resolved Problems:    * No resolved hospital problems. *     REASON FOR HOSPITALIZATION:   Chief Complaint   Patient presents with   [facility] Chest Pain        Hospital Course    Mr. Demetra Gupta went to the ED to be evaluated for chest pain and difficulty breathing. Pt was initially found to be tacypneic in the 40s, with SpO2 in the 80s on RA. Pt was placed on NRB and SpO2 increased to 94%. In addition he had a SBP in the 200's and was placed on a NTG gtt. Pt was eventually felt better after placed on BiPAP. Initial work up revealed CXR concerning for mild pulmonary edema. Pt was admitted to the [facility] unit for acute on chronic CHF. Later in the day, he was transferred to the medical intensive care unit due to worsening respiratory status that required intubation due to increased work of breathing secondary to septic shock from a left lower lobe pneumonia and COPD exacerbation; [facility] worsening shortness of breath and left sided chest pain that woke him up from sleep that morning.   He became significantly more ill quite rapidly and required the use of

## 2020-11-18 NOTE — SIGNIFICANT EVENT
At approximately 2:10 AM pt went into asystole arrest.  Pt received a total of 9 mg of epinephrine, 5 mg of bicarbonate and 2 grams of Calcium chloride. Pt had 6 episodes of arrest with 5 episodes of ROSC. After the the first ROSC, EKG revealed patient to be in atrial fibrillation with aberrant conduction. Given the widened QRS and peaked T waves was concerning for hyperkalemia. After the first round, lab called the unit to report a critical potassium of 6.0. In the setting of the patient having a known pH of 6.9 prior to rest, patient was given 1 amp of D50 and 10 units of insulin IV. At approximately 2:45 AM, nephrology was contacted and they agree with the plan of insulin, D50, bicarb, and calcium as needed. After the 4th arrest and ROSC, POC K from ABG revealed K of 5.2. Family was at bedside after the third round of cardiac arrest.  Dr. Patti Villanueva, on-call doctor, was notified the patient had 4 cardiac arrests with ROSC and family at bedside wishes to make him a DNR CC, despite being only 1 of 5 children. He agreed that if the family understood that heroic efforts have been attempted, they may ask to stop resuscitation efforts. At bedside, son Ella Girard and his wife, noted they understood and wished to stop CPR if not regained after 1 round. The sister in [facility] was contacted and deferred all decision making to brother at bedside. Approximately 3:30 AM, attempts are made to talk to the third brother Mirta Godinez, listed in the previous nursing notes, but was unable to make contact. The other 2 children live in [place] and no one had any contact information from them. On the 6 cardiac arrest, 1 round CPR was performed with epinephrine given. Given the extensive amount of resuscitative efforts, family's wishes to make patient DNR CC during his stay and the family wishing for resuscitative efforts to be stopped, time of death was called.   Patient had asystole on monitor, flat line the arterial line, no palpable pulses, and was not breathing. Time of death was called at 3:50 AM on 11/18/2020.

## 2020-11-18 NOTE — PROGRESS NOTES
Pt coded, writer responded with ICU team and AMBU'd pt on 100% fio2 until ROSC.  MD Wolfgang Patel at [facility] [employer]

## 2020-11-18 NOTE — PROGRESS NOTES
Pt coded, writer responded with ICU team and AMBU'd pt on 100% fio2 until ROSC.  MD Driscoll Skill at [facility]
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
## 2020-11-18 NOTE — PROGRESS NOTES
Pt coded, writer responded with ICU team and AMBU'd pt on 100% fio2 until ROSC. MD Bjorn Casiano at bedside.

## 2020-11-18 NOTE — SIGNIFICANT EVENT
ICU DEATH NOTE    PATIENT NAME: Sameer Lindsey  YOB: 1940  MEDICAL RECORD NO. [id number]  DATE: 11/18/2020  PRIMARY CARE PHYSICIAN: Yanni Galindo MD (Inactive)    DIAGNOSIS OF DEATH     I have confirmed the death of this patient in accordance with accepted medical standards.   The patient is dead as evidenced by cardiac death or cessation of brain function:    Cardiac Death (check all that apply):     [x]  Absence of respiratory effort by observation     [x]  Absence of pulse by palpation     [x]  Absence of blood pressure by arterial line     [x]  Absence of sustainable cardiac rhythm by monitor    Death by Cessation of Brain Function (check all that apply):     []  Absence of Cerebral Function with no motor response     []  Absence of brain stem function by systemic physical exam     []  Failure to respond with respiratory drive by apnea test     []  Cerebral Electrical silence as interpreted by qualified reader        OR     []  Absence of brain blood flow by radiologic technique      CERTIFICATION OF DEATH     I have pronounced the patient dead on:     Date: 11/18/2020 at 3:50 AM     NOTIFICATIONS     Attending physician that will sign Death Certificate: Dr. Bernardo Booker notified: Name and/or Relationship: Panda Ceron (Son)                                                          []  Per Nursing     notified (Name):    Not needed to be called                                                     []  Per [address], DO  Emergency Medicine Resident, PGY2  Department of Internal Medicine/ Critical care  [facility])   11/18/2020, 4:54 AM

## 2020-11-18 NOTE — PROGRESS NOTES
Pt coded, writer responded with ICU team and AMBU'd pt on 100% fio2 until ROSC.  MD Yue Pérez at [facility] [employer]

## 2020-11-18 NOTE — PROGRESS NOTES
RN observed pt's blood pressure and etCO2 declining. Patient asystole at 0210 and CPR was initiated at this time. See code notes for further explanation.

## 2020-11-18 NOTE — PROGRESS NOTES
Pt coded, writer responded with ICU team and AMBU'd pt on 100% fio2 until pt . Vent discontinued.  MD Kenzie Uribe at [facility] [employer]

## 2020-11-18 NOTE — PLAN OF CARE
Forrestine Pallas, RN  Outcome: Ongoing  11/17/2020 0846 by Wally Barroso RCP  Outcome: Ongoing  Goal: Ability to express needs and understand communication  11/17/2020 2240 by Forrestine Pallas, RN  Outcome: Ongoing  11/17/2020 0846 by Wally Barroso RCP  Outcome: Ongoing  Goal: Mobility/activity is maintained at optimum level for patient  Outcome: Ongoing     Problem: SKIN INTEGRITY  Goal: Skin integrity is maintained or improved  Outcome: Ongoing     Problem: Confusion - Acute:  Goal: Absence of continued neurological deterioration signs and symptoms  Description: Absence of continued neurological deterioration signs and symptoms  Outcome: Ongoing  Goal: Mental status will be restored to baseline  Description: Mental status will be restored to baseline  Outcome: Ongoing     Problem: Discharge Planning:  Goal: Ability to perform activities of daily living will improve  Description: Ability to perform activities of daily living will improve  Outcome: Ongoing  Goal: Participates in care planning  Description: Participates in care planning  Outcome: Ongoing  Goal: Discharged to appropriate level of care  Description: Discharged to appropriate level of care  Outcome: Ongoing     Problem: Injury - Risk of, Physical Injury:  Goal: Will remain free from falls  Description: Will remain free from falls  11/17/2020 2240 by Forrestine Pallas, RN  Outcome: Ongoing  11/17/2020 1059 by Alma Rosa Trotter RN  Outcome: Ongoing  Goal: Absence of physical injury  Description: Absence of physical injury  11/17/2020 2240 by Forrestine Pallas, RN  Outcome: Ongoing  11/17/2020 1059 by Alma Rosa Trotter RN  Outcome: Ongoing     Problem: Mood - Altered:  Goal: Mood stable  Description: Mood stable  Outcome: Ongoing  Goal: Absence of abusive behavior  Description: Absence of abusive behavior  Outcome: Ongoing  Goal: Verbalizations of feeling emotionally comfortable while being cared for will increase  Description: Verbalizations of feeling emotionally comfortable while being cared for will increase  Outcome: Ongoing     Problem: Psychomotor Activity - Altered:  Goal: Absence of psychomotor disturbance signs and symptoms  Description: Absence of psychomotor disturbance signs and symptoms  Outcome: Ongoing     Problem: Sensory Perception - Impaired:  Goal: Demonstrations of improved sensory functioning will increase  Description: Demonstrations of improved sensory functioning will increase  Outcome: Ongoing  Goal: Decrease in sensory misperception frequency  Description: Decrease in sensory misperception frequency  Outcome: Ongoing  Goal: Able to refrain from responding to false sensory perceptions  Description: Able to refrain from responding to false sensory perceptions  Outcome: Ongoing  Goal: Demonstrates accurate environmental perceptions  Description: Demonstrates accurate environmental perceptions  Outcome: Ongoing  Goal: Able to distinguish between reality-based and nonreality-based thinking  Description: Able to distinguish between reality-based and nonreality-based thinking  Outcome: Ongoing  Goal: Able to interrupt nonreality-based thinking  Description: Able to interrupt nonreality-based thinking  Outcome: Ongoing     Problem: Sleep Pattern Disturbance:  Goal: Appears well-rested  Description: Appears well-rested  Outcome: Ongoing     Problem: Skin Integrity:  Goal: Will show no infection signs and symptoms  Description: Will show no infection signs and symptoms  Outcome: Ongoing  Goal: Absence of new skin breakdown  Description: Absence of new skin breakdown  Outcome: Ongoing     Problem: Airway Clearance - Ineffective:  Goal: Ability to maintain a clear airway will improve  Description: Ability to maintain a clear airway will improve  Outcome: Ongoing     Problem: Anxiety/Stress:  Goal: Level of anxiety will decrease  Description: Level of anxiety will decrease  Outcome: Ongoing     Problem: Aspiration:  Goal: Absence of aspiration  Description: Absence of aspiration  Outcome: Ongoing     Problem: Cardiac Output - Decreased:  Goal: Hemodynamic stability will improve  Description: Hemodynamic stability will improve  Outcome: Ongoing     Problem: Fluid Volume - Imbalance:  Goal: Absence of imbalanced fluid volume signs and symptoms  Description: Absence of imbalanced fluid volume signs and symptoms  Outcome: Ongoing     Problem: Gas Exchange - Impaired:  Goal: Levels of oxygenation will improve  Description: Levels of oxygenation will improve  Outcome: Ongoing     Problem: Mental Status - Impaired:  Goal: Mental status will be restored to baseline  Description: Mental status will be restored to baseline  Outcome: Ongoing     Problem: Nutrition Deficit:  Goal: Ability to achieve adequate nutritional intake will improve  Description: Ability to achieve adequate nutritional intake will improve  Outcome: Ongoing     Problem: Pain:  Goal: Pain level will decrease  Description: Pain level will decrease  Outcome: Ongoing  Goal: Recognizes and communicates pain  Description: Recognizes and communicates pain  Outcome: Ongoing  Goal: Control of acute pain  Description: Control of acute pain  Outcome: Ongoing  Goal: Control of chronic pain  Description: Control of chronic pain  Outcome: Ongoing     Problem: Serum Glucose Level - Abnormal:  Goal: Ability to maintain appropriate glucose levels will improve to within specified parameters  Description: Ability to maintain appropriate glucose levels will improve to within specified parameters  Outcome: Ongoing     Problem: Skin Integrity - Impaired:  Goal: Will show no infection signs and symptoms  Description: Will show no infection signs and symptoms  Outcome: Ongoing  Goal: Absence of new skin breakdown  Description: Absence of new skin breakdown  Outcome: Ongoing     Problem: Tissue Perfusion, Altered:  Goal: Circulatory function within specified parameters  Description: Circulatory function within specified parameters  Outcome: Ongoing     Problem: Tissue Perfusion - Cardiopulmonary, Altered:  Goal: Absence of angina  Description: Absence of angina  Outcome: Ongoing  Goal: Hemodynamic stability will improve  Description: Hemodynamic stability will improve  Outcome: Ongoing

## 2020-11-18 NOTE — PROGRESS NOTES
Pt coded, writer responded with ICU team and AMBU'd pt on 100% fio2 until ROSC. MD Wolfgang Patel at bedside.

## 2020-11-18 NOTE — FLOWSHEET NOTE
[facility]   Patient Death Note  DEATH   Shift date: 20   Shift day: Tuesday  Shift #: 3                 Room # 0122/0122-01   Name: Jennyfer Foster            Age: 68 y.o. Gender: male          Christian: [address] of Taoist:   Admit Date & Time: 2020  5:12 AM     Referral:    Actual date of death: 20   TOD: 65 AM       SITUATION AT DEATH:  Pt is 67 yo male. During the course of the night, patient became significantly ill is now maxed on 4 pressors. Family was called to bedside and code status change was discussed during EOL situations.  was bedside support and assisted with making calls and getting family medical updates. IS THIS A 'S CASE? No    SPIRITUAL/EMOTIONAL INTERVENTION:   responded to nurse call during pt coding.  was present when son and wife arrived.  was compassionate support offering words of comfort during tearful times with family.  offered prayer and bedside ritual for family after pronounced time of death.  provided comfort blanket and grief package to family. Gratitude was expressed by family. Family Received Grief Packet? Yes     NAME AND PHONE NUMBER OF DOCTOR SIGNING DEATH CERTIFICATE: Paddy Berry MD  [phone]     spoke with [facility] Nurse , who will contact the  home.  home/cremation center will be called in to spiritual care office. Family would like cremation but information is not complete at time.  gave business card and requested a possible 24 hour return call. Copy of COMPLETED Release of Body Form Received? Yes     HOME:  Name: [address] [place] [address] Drive: Unknown  Phone Number: Unknown    NEXT OF KIN:  Name: Florentin McKenzie  Relationship: Son  Street Address:  [address]: [place]: [facility]  Zip code: [address]  Phone Number: [phone]    [facility]? Yes. Family will contact spiritual care office within 24 hours. IF SO, WHAT?     11/18/20 0651   Encounter Summary   Services provided to: Patient   Referral/Consult From: Multi-disciplinary team   Support System Family members   Continue Visiting   (11/17/20)   Complexity of Encounter High   Length of Encounter 45 minutes   Spiritual Assessment Completed Yes   Crisis   Type Code   Assessment Tearful   Intervention Explored feelings, thoughts, concerns   Outcome Comfort     Electronically signed by Amee Bryant on 11/18/2020 at 6:52 AM.  [facility]  [phone]

## 2020-11-19 LAB
CULTURE: ABNORMAL
CULTURE: ABNORMAL
DIRECT EXAM: ABNORMAL
Lab: ABNORMAL
SPECIMEN DESCRIPTION: ABNORMAL

## 2020-11-22 LAB
CULTURE: NORMAL
CULTURE: NORMAL
Lab: NORMAL
Lab: NORMAL
SPECIMEN DESCRIPTION: NORMAL
SPECIMEN DESCRIPTION: NORMAL